# Patient Record
Sex: FEMALE | Race: WHITE | NOT HISPANIC OR LATINO | Employment: UNEMPLOYED | URBAN - METROPOLITAN AREA
[De-identification: names, ages, dates, MRNs, and addresses within clinical notes are randomized per-mention and may not be internally consistent; named-entity substitution may affect disease eponyms.]

---

## 2019-04-01 ENCOUNTER — HOSPITAL ENCOUNTER (EMERGENCY)
Facility: HOSPITAL | Age: 49
Discharge: HOME | End: 2019-04-01
Attending: EMERGENCY MEDICINE
Payer: MEDICAID

## 2019-04-01 VITALS
DIASTOLIC BLOOD PRESSURE: 71 MMHG | BODY MASS INDEX: 24.84 KG/M2 | SYSTOLIC BLOOD PRESSURE: 142 MMHG | HEART RATE: 81 BPM | TEMPERATURE: 98 F | OXYGEN SATURATION: 98 % | HEIGHT: 62 IN | WEIGHT: 135 LBS | RESPIRATION RATE: 18 BRPM
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
## 2019-04-01 DIAGNOSIS — S41.112A ARM LACERATION, LEFT, INITIAL ENCOUNTER: Primary | ICD-10-CM

## 2019-04-01 DIAGNOSIS — S51.852A DOG BITE OF LEFT FOREARM: ICD-10-CM

## 2019-04-01 LAB
B-HCG UR QL: NEGATIVE
CTP QC/QA: YES

## 2019-04-01 PROCEDURE — 99284 EMERGENCY DEPT VISIT MOD MDM: CPT | Mod: 25

## 2019-04-01 PROCEDURE — 90715 TDAP VACCINE 7 YRS/> IM: CPT | Performed by: PHYSICIAN ASSISTANT

## 2019-04-01 PROCEDURE — 90471 IMMUNIZATION ADMIN: CPT | Performed by: PHYSICIAN ASSISTANT

## 2019-04-01 PROCEDURE — 12001 RPR S/N/AX/GEN/TRNK 2.5CM/<: CPT

## 2019-04-01 PROCEDURE — 81025 URINE PREGNANCY TEST: CPT | Performed by: PHYSICIAN ASSISTANT

## 2019-04-01 PROCEDURE — 25000003 PHARM REV CODE 250: Performed by: PHYSICIAN ASSISTANT

## 2019-04-01 PROCEDURE — 63600175 PHARM REV CODE 636 W HCPCS: Performed by: PHYSICIAN ASSISTANT

## 2019-04-01 RX ORDER — ACETAMINOPHEN 500 MG
1000 TABLET ORAL
Status: COMPLETED | OUTPATIENT
Start: 2019-04-01 | End: 2019-04-01

## 2019-04-01 RX ORDER — AMOXICILLIN AND CLAVULANATE POTASSIUM 875; 125 MG/1; MG/1
1 TABLET, FILM COATED ORAL 2 TIMES DAILY
Qty: 19 TABLET | Refills: 0 | Status: SHIPPED | OUTPATIENT
Start: 2019-04-01 | End: 2019-04-11

## 2019-04-01 RX ORDER — LIDOCAINE HYDROCHLORIDE 10 MG/ML
10 INJECTION INFILTRATION; PERINEURAL
Status: COMPLETED | OUTPATIENT
Start: 2019-04-01 | End: 2019-04-01

## 2019-04-01 RX ORDER — BACITRACIN 500 [USP'U]/G
OINTMENT TOPICAL
Status: COMPLETED | OUTPATIENT
Start: 2019-04-01 | End: 2019-04-01

## 2019-04-01 RX ORDER — AMOXICILLIN AND CLAVULANATE POTASSIUM 875; 125 MG/1; MG/1
1 TABLET, FILM COATED ORAL
Status: COMPLETED | OUTPATIENT
Start: 2019-04-01 | End: 2019-04-01

## 2019-04-01 RX ADMIN — BACITRACIN: 500 OINTMENT TOPICAL at 10:04

## 2019-04-01 RX ADMIN — ACETAMINOPHEN 1000 MG: 500 TABLET, FILM COATED ORAL at 10:04

## 2019-04-01 RX ADMIN — CLOSTRIDIUM TETANI TOXOID ANTIGEN (FORMALDEHYDE INACTIVATED), CORYNEBACTERIUM DIPHTHERIAE TOXOID ANTIGEN (FORMALDEHYDE INACTIVATED), BORDETELLA PERTUSSIS TOXOID ANTIGEN (GLUTARALDEHYDE INACTIVATED), BORDETELLA PERTUSSIS FILAMENTOUS HEMAGGLUTININ ANTIGEN (FORMALDEHYDE INACTIVATED), BORDETELLA PERTUSSIS PERTACTIN ANTIGEN, AND BORDETELLA PERTUSSIS FIMBRIAE 2/3 ANTIGEN 0.5 ML: 5; 2; 2.5; 5; 3; 5 INJECTION, SUSPENSION INTRAMUSCULAR at 10:04

## 2019-04-01 RX ADMIN — LIDOCAINE HYDROCHLORIDE 10 ML: 10 INJECTION, SOLUTION INFILTRATION; PERINEURAL at 10:04

## 2019-04-01 RX ADMIN — AMOXICILLIN AND CLAVULANATE POTASSIUM 1 TABLET: 875; 125 TABLET, FILM COATED ORAL at 10:04

## 2019-04-02 NOTE — ED PROVIDER NOTES
Encounter Date: 4/1/2019       History     Chief Complaint   Patient presents with    Animal Bite     pt states she was bit by her dog today on left forearm      Patient is a 49-year-old female presenting to the ER for evaluation of animal bite.  Patient states that she got in between her to pit bulls when 1 bit her.  She states she was bit on the left hand and forearm.  She denies any paresthesia or focal weakness or extremity.  She is not up-to-date on her tetanus vaccination.  Patient states that her dogs are up-to-date on their vaccinations.  She denies use of blood thinners.    The history is provided by the patient.     Review of patient's allergies indicates:   Allergen Reactions    Bactrim [sulfamethoxazole-trimethoprim] Hives     Past Medical History:   Diagnosis Date    Hypothyroid      Past Surgical History:   Procedure Laterality Date    BREAST SURGERY      CARPAL TUNNEL RELEASE      LIPOSUCTION      TONSILLECTOMY       History reviewed. No pertinent family history.  Social History     Tobacco Use    Smoking status: Current Every Day Smoker     Types: Cigarettes    Smokeless tobacco: Never Used   Substance Use Topics    Alcohol use: Yes     Comment: occasional    Drug use: No     Review of Systems   Constitutional: Negative for chills.   Respiratory: Negative for shortness of breath.    Cardiovascular: Negative for chest pain.   Musculoskeletal: Positive for myalgias.   Skin: Positive for wound.   Allergic/Immunologic: Negative for immunocompromised state.   Neurological: Negative for weakness and numbness.   Hematological: Does not bruise/bleed easily.   Psychiatric/Behavioral: Negative for confusion.       Physical Exam     Initial Vitals [04/01/19 2129]   BP Pulse Resp Temp SpO2   (!) 165/93 89 18 98.5 °F (36.9 °C) 98 %      MAP       --         Physical Exam    Vitals reviewed.  Constitutional: She appears well-developed and well-nourished. She is not diaphoretic. No distress.   HENT:    Head: Normocephalic and atraumatic.   Mouth/Throat: Oropharynx is clear and moist.   Eyes: Conjunctivae and EOM are normal.   Neck: Neck supple.   Cardiovascular: Normal rate, regular rhythm and normal heart sounds.   Pulmonary/Chest: Breath sounds normal.   Neurological: She is alert and oriented to person, place, and time. She has normal strength. No sensory deficit.   Skin: Skin is warm. Laceration (2 cm gaping laceration left ventral forearm.  0.25 cm laceration to left dorsal hand) noted.              ED Course   Lac Repair  Date/Time: 4/1/2019 11:24 PM  Performed by: Claudia Shultz PA-C  Authorized by: Kelly Ordonez MD   Body area: upper extremity  Location details: left upper arm  Laceration length: 2 cm  Foreign bodies: no foreign bodies  Tendon involvement: none  Nerve involvement: none  Vascular damage: yes    Anesthesia:  Local Anesthetic: lidocaine 1% without epinephrine  Preparation: Patient was prepped and draped in the usual sterile fashion.  Irrigation solution: saline  Irrigation method: syringe  Amount of cleaning: extensive  Debridement: none  Degree of undermining: none  Skin closure: 4-0 Prolene  Number of sutures: 3  Technique: simple  Approximation: loose  Approximation difficulty: simple  Dressing: 4x4 sterile gauze and antibiotic ointment  Patient tolerance: Patient tolerated the procedure well with no immediate complications        Labs Reviewed   POCT URINE PREGNANCY          Imaging Results          X-Ray Hand 3 view Left (Final result)  Result time 04/01/19 22:47:59    Final result by Brice Levy MD (04/01/19 22:47:59)                 Impression:      No acute osseous abnormality identified.      Electronically signed by: Brice Levy MD  Date:    04/01/2019  Time:    22:47             Narrative:    EXAMINATION:  XR FOREARM LEFT; XR HAND COMPLETE 3 VIEW LEFT    CLINICAL HISTORY:  animal bite;Open bite of left forearm, initial encounter    TECHNIQUE:  AP and lateral views  of the left forearm were performed.  Left hand three views.    COMPARISON:  None    FINDINGS:  No evidence of acute displaced fracture, dislocation, or osseous destructive process.  No significant elbow joint effusion.  There is soft tissue injury with small amount of subcutaneous soft tissue air seen at the anterior aspect of the mid forearm.  No radiopaque retained foreign body seen.                               X-Ray Forearm Left (Final result)  Result time 04/01/19 22:47:59    Final result by Brice Levy MD (04/01/19 22:47:59)                 Impression:      No acute osseous abnormality identified.      Electronically signed by: Brice Levy MD  Date:    04/01/2019  Time:    22:47             Narrative:    EXAMINATION:  XR FOREARM LEFT; XR HAND COMPLETE 3 VIEW LEFT    CLINICAL HISTORY:  animal bite;Open bite of left forearm, initial encounter    TECHNIQUE:  AP and lateral views of the left forearm were performed.  Left hand three views.    COMPARISON:  None    FINDINGS:  No evidence of acute displaced fracture, dislocation, or osseous destructive process.  No significant elbow joint effusion.  There is soft tissue injury with small amount of subcutaneous soft tissue air seen at the anterior aspect of the mid forearm.  No radiopaque retained foreign body seen.                                       APC / Resident Notes:   Patient seen in the ER promptly upon arrival.  She is afebrile, no acute distress.  Physical examination does reveal gaping laceration to the left forearm which will require suture.  Patient given Augmentin and Tylenol in the ED.  She was updated on her tetanus vaccination.      Wound was aggressively cleansed.  1% lidocaine uses anesthetic agent.  Three the stitches were placed using 4-0 Prolene sutures.  The wound was loosely approximated.  Patient tolerated this well. Bleeding controlled.  Will prescribe patient home on Augmentin to use as directed.  She was advised to keep the  affected region dry and clean.  Advised to watch for signs of infection including redness, swelling, drainage, fever chills.  She was given strict return precautions to the ED.  Patient advised to take Tylenol for pain if needed.  Patient agreeable to this treatment plan.  She is stable for discharge and close follow-up.                     Clinical Impression:       ICD-10-CM ICD-9-CM   1. Arm laceration, left, initial encounter S41.112A 884.0   2. Dog bite of left forearm S51.852A 881.00    W54.0XXA E906.0         Disposition:   Disposition: Discharged  Condition: Stable                        Claudia Shultz PA-C  04/02/19 1524

## 2019-04-02 NOTE — ED TRIAGE NOTES
Pt arrived to the ED via POV from home with c/o bite. Pt states she got between her two dogs and was bitten by one. On admit to the bed, bleeding controlled with gauze and coban.

## 2019-07-26 DIAGNOSIS — G89.4 CHRONIC PAIN SYNDROME: ICD-10-CM

## 2019-07-26 DIAGNOSIS — M25.552 LEFT HIP PAIN: ICD-10-CM

## 2019-07-26 DIAGNOSIS — M77.11 RIGHT LATERAL EPICONDYLITIS: ICD-10-CM

## 2019-07-26 DIAGNOSIS — M51.36 DEGENERATION OF LUMBAR INTERVERTEBRAL DISC: Primary | ICD-10-CM

## 2019-08-15 ENCOUNTER — CLINICAL SUPPORT (OUTPATIENT)
Dept: REHABILITATION | Facility: HOSPITAL | Age: 49
End: 2019-08-15
Payer: MEDICAID

## 2019-08-15 DIAGNOSIS — M54.9 BACK PAIN, UNSPECIFIED BACK LOCATION, UNSPECIFIED BACK PAIN LATERALITY, UNSPECIFIED CHRONICITY: ICD-10-CM

## 2019-08-15 DIAGNOSIS — M62.81 MUSCLE WEAKNESS: ICD-10-CM

## 2019-08-15 DIAGNOSIS — M25.60 JOINT STIFFNESS: ICD-10-CM

## 2019-08-15 PROCEDURE — 97110 THERAPEUTIC EXERCISES: CPT | Mod: PN

## 2019-08-15 PROCEDURE — 97161 PT EVAL LOW COMPLEX 20 MIN: CPT | Mod: PN

## 2019-08-15 NOTE — PLAN OF CARE
[facility]  Physical Therapy Initial Evaluation    Name: Jessica HADLEY  Clinic Number: [id number]    Therapy Diagnosis:   Encounter Diagnoses   Name Primary?    Joint stiffness     Muscle weakness     Back pain, unspecified back location, unspecified back pain laterality, unspecified chronicity      Physician: Naheed Mccabe, KALPESH    Physician Orders: PT Eval and Treat   Medical Diagnosis from Referral:   M51.36 (ICD-10-CM) - Degeneration of lumbar intervertebral disc   G89.4 (ICD-10-CM) - Chronic pain syndrome   M25.552 (ICD-10-CM) - Left hip pain   M77.11 (ICD-10-CM) - Right lateral epicondylitis       Evaluation Date: 8/15/2019  Plan of Care Expiration: 11/15/19    Authorization Period Expiration: 8/20/19  Visit # / Visits authorized: 1/ 1      Time In: 1300  Time Out: 1400    Total Billable Time: 60 minutes    Precautions: Standard    Subjective   Date of onset: several years    History of current condition:   JESSICA  is a 49 year old right handed female  presenting with bilateral hand weakness, left hip pain, low back pain and left elbow pain.  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  She state her pain is due to wear and tear.  She reports injections in the elbows 8 months ago with moderate relief.  She denies any recent diagnostics of the lumbar spine.  Her goal is to get rid of some pain.  She would also like to work or sleep through the night without pain.      Medical History:   Past Medical History:   Diagnosis Date    Hypothyroid        Surgical History:   Jessica HADLEY  has a past surgical history that includes Breast surgery; Liposuction; Carpal tunnel release; and Tonsillectomy.    Medications:   Jessica has a current medication list which includes the following prescription(s): levothyroxine.    Allergies:   Review of patient's allergies indicates:   Allergen Reactions    Bactrim  [sulfamethoxazole-trimethoprim] Hives        Imaging: none on record for low back    Prior Therapy: none  Social History:  Lives with grandson, 2 story  Occupation: [employer], housecleaning, touring/ for [email or web address]  Prior Level of Function: independent  Current Level of Function: independent    Pain:  Current 7/10, worst 10/10, best 5/10   Location:  lumbar      Aggravating Factors: prolonged sitting, standing, sleeping, working, driving, housework  ('pretty constant)  Easing Factors: hot baths    Pts goals:  Her goal is to get rid of some pain.  She would also like to work or sleep through the night without pain.      Objective     Observation: pt stands with right hip higher than contralateral left. Left lateral shift. Flat back posture.   Gait: No sign of antalgia or abnormalities with gait.   Palpation: Mild tenderness on palpation of left lateral epicondyle, T12-S1 interspinous space      Range of Motion/Strength:      Left Elbow: AROM: WNL   4/5 gross strength.     Lumbar AROM: Degrees   Flexion 55   Extension 15   Right side bending 10   Left side bending 10   Lumbar quadrant reveals: increase in discomfort in all planes    AROM: Bilateral LE: Grossly WFL  MMT:  Right LE: 4-   Left LE: 4-  Abdominal Strength: 4-    Mobility: L/S p/a grade 2-  Flexibility: hip flexor length: fair      Hamstring Length:fair    Bed Mobility:Independent  Transfers: Independent    Special Tests:   -LLD: Left>right 1 cm (corrected with MET)  -Slump: Negative  -SLR: negative  -Hip scour: negative  -Zaid's: Negative  -SIJ gapping and compression: Negative    CMS Impairment/Limitation/Restriction for FOTO Lumbar Spine Survey  Status Limitation G-Code CMS Severity Modifier  Intake 38% 62% Current Status CL - At least 60 percent but less than 80 percent  Predicted 49% 51% Goal Status+ CK - At least 40 percent but less than 60 percent    TREATMENT     Treatment Time In: 1330  Treatment Time Out: 1400    Total Treatment time  separate from Evaluation: 30 minutes    JESSICA received therapeutic exercises to develop strength, endurance, ROM, flexibility, posture and core stabilization for 10 minutes including:   -LTR x 20  - (shift correction) left to right x 20  - elbow extensors stretch 20 sec x 4  -TrA recruitment 2 x 10    JESSICA received the following manual therapy techniques:  were applied to the: lumbar spine for 5 minutes, including:  MET left innominate posterior      Education provided:   - HEP compliance    Written Home Exercises Provided: yes.    Exercises were reviewed and JESSICA was able to demonstrate them prior to the end of the session.  JESSICA demonstrated good  understanding of the education provided.     See EMR under Patient Instructions for exercises provided 8/15/2019.    Assessment     Pt presents with signs and symptoms consistent with referring diagnosis. Evaluation has determined a decrease in functional status and subjective and objective deficits that can be addressed by physical therapy intervention. Pt demonstrates pain limiting functional activities. Decreased flexibility and strength limiting normal movement patterns. Decreased segmental motion. Decreased postural strength and awareness. Positive special testing. Decreased participation in functional and recreational activities. Subjective and objective measures are addressed by goals in the plan of care.  Patient/family are involved in the development of these goals. Patient/family are educated about current injury and treatment.       Plan of care was dicussed with patient. Pt will benefit from skilled outpatient Physical Therapy to address the deficits stated above and in the chart below, provide pt/family education, and to maximize pt's level of independence. Pt's spiritual, cultural and educational needs considered and patient is agreeable to the plan of care and goals as stated below:     Pt prognosis is Good.  Anticipated Barriers for therapy:  none    Medical Necessity is demonstrated by the following  History  Co-morbidities and personal factors that may impact the plan of care Co-morbidities:    CTS    Personal Factors:   no deficits     low   Examination  Body Structures and Functions, activity limitations and participation restrictions that may impact the plan of care Body Regions:   back  lower extremities  upper extremities    Body Systems:    gross symmetry  ROM  strength    Participation Restrictions:   Work duties    Activity limitations:   Learning and applying knowledge  no deficits    General Tasks and Commands  no deficits    Communication  no deficits    Mobility  lifting and carrying objects  walking  moving around using equipment (WC)    Self care  no deficits    Domestic Life  shopping  cooking  doing house work (cleaning house, washing dishes, laundry)    Interactions/Relationships  no deficits    Life Areas  no deficits    Community and Social Life  community life  recreation and leisure         low   Clinical Presentation stable and uncomplicated low   Decision Making/ Complexity Score: low     Goals:    Short Term Goals (4 Weeks):     1.Pt to increase strength by a 1/2 grade of muscles test to allow for improvement in functional activities such as performing chores.  2.Pt to improve range of motion by 25% to allow for improved functional mobility to allow for improvement in IADLs.   3.Pt to report compliance with HEP and demonstrate proper exercise technique to PT to show competence with self management of condition.  4.Decrease pain by 25% during functional activities.    Long Term Goals (12 Weeks):     1. Increase ROM to allow improved joint biomechanics during functional activities.   2.Increase trunk and lower extremity strength to within normal limits during functional activities.   3. Independent with home exercise program.   4. Full return to functional activities with manageable complaints.  5. Patient to demonstrate improved  posture and body mechanics.  6. Decrease pain by 75% during functional activities.    Plan     Plan of care Certification: 8/15/2019 to 11/15/19.    Recommended Treatment Plan: 2 times per week for 12 weeks with treatments to consist of:  Neuromuscular and postural re-education,  training, therapeutic exercise, therapeutic activities,balance training, gait training, manual therapy, soft tissue mobilization, ROM exercises, Cardiovascular,  Postural stabilization, manual traction, spinal mobilization, moist heat, cryotherapy, electrical stimulation, ultrasound, home exercise education and planning.    Sebastián Zepeda, PT

## 2019-09-04 ENCOUNTER — CLINICAL SUPPORT (OUTPATIENT)
Dept: REHABILITATION | Facility: HOSPITAL | Age: 49
End: 2019-09-04
Payer: MEDICAID

## 2019-09-04 DIAGNOSIS — M25.60 JOINT STIFFNESS: ICD-10-CM

## 2019-09-04 DIAGNOSIS — M62.81 MUSCLE WEAKNESS: ICD-10-CM

## 2019-09-04 DIAGNOSIS — M54.9 BACK PAIN, UNSPECIFIED BACK LOCATION, UNSPECIFIED BACK PAIN LATERALITY, UNSPECIFIED CHRONICITY: ICD-10-CM

## 2019-09-04 PROCEDURE — 97110 THERAPEUTIC EXERCISES: CPT | Mod: PN

## 2019-09-04 NOTE — PROGRESS NOTES
Physical Therapy Daily Treatment Note     Name: Jessica HADLEY  Clinic Number: [id number]    Therapy Diagnosis:   Encounter Diagnoses   Name Primary?    Joint stiffness     Muscle weakness     Back pain, unspecified back location, unspecified back pain laterality, unspecified chronicity      Physician: Naheed Mccabe FNP-C    Visit Date: 9/4/2019  Physician Orders: PT Eval and Treat   Medical Diagnosis from Referral:   M51.36 (ICD-10-CM) - Degeneration of lumbar intervertebral disc   G89.4 (ICD-10-CM) - Chronic pain syndrome   M25.552 (ICD-10-CM) - Left hip pain   M77.11 (ICD-10-CM) - Right lateral epicondylitis         Evaluation Date: 8/15/2019  Plan of Care Expiration: 11/15/19     Authorization Period Expiration: 11/3/19  Visit # / Visits authorized: 1/10    Time In: 0820  Time Out: 0925    Total Billable Time: 65 minutes    Precautions: standard     Subjective     Pt reports: no significant changes since initial visit.  She was fairly compliant with home exercise program.  Response to previous treatment: fair  Functional change: none    Pain: 8/10  Location:  Lumbar     Objective     JESSICA received therapeutic exercises to develop strength, endurance, ROM, flexibility, posture and core stabilization for 45 minutes including:     -LTR x 20  - (shift correction) left to right x 20  -prone press-up x 20  -prone on elbows 1min x 2  - elbow extensors stretch 20 sec x 4  -prone opposite arm/leg, multifidi x 10  -pelvic tilts x 20  -TrA recruitment 2 x 10  -ball squeeze 2 x 10  -supine resisted hip abduction 3 x 10 RTB  -piriformis stretch 20 sec x 4     JESSICA received the following manual therapy techniques:  were applied to the: lumbar spine for 10 minutes, including:  MET left innominate posterior; np  -lumbar distraction in hooklying with belt    CP x 10 min post treatment L/S.         Education provided:   - HEP compliance      Written Home Exercises Provided: Patient instructed to cont prior  HEP.  Exercises were reviewed and JESSICA was able to demonstrate them prior to the end of the session.  JESSICA demonstrated good  understanding of the education provided.     See EMR under Patient Instructions for exercises provided 9/4/2019.    Assessment     No sign of pelvic obliquity today.  Improved performance with pelvic mobility. Requires min cueing with abdominal response.  Fair response to initiation of manual traction. JESSICA is progressing well towards her goals.   Pt prognosis is Good.     Pt will continue to benefit from skilled outpatient physical therapy to address the deficits listed in the problem list box on initial evaluation, provide pt/family education and to maximize pt's level of independence in the home and community environment.     Pt's spiritual, cultural and educational needs considered and pt agreeable to plan of care and goals.     Anticipated barriers to physical therapy:     Short Term Goals (4 Weeks):     1.Pt to increase strength by a 1/2 grade of muscles test to allow for improvement in functional activities such as performing chores.  2.Pt to improve range of motion by 25% to allow for improved functional mobility to allow for improvement in IADLs.   3.Pt to report compliance with HEP and demonstrate proper exercise technique to PT to show competence with self management of condition.  4.Decrease pain by 25% during functional activities.    Long Term Goals (12 Weeks):     1. Increase ROM to allow improved joint biomechanics during functional activities.   2.Increase trunk and lower extremity strength to within normal limits during functional activities.   3. Independent with home exercise program.   4. Full return to functional activities with manageable complaints.  5. Patient to demonstrate improved posture and body mechanics.  6. Decrease pain by 75% during functional activities.       Plan       Recommended Treatment Plan: 2-3 times per week for 12 weeks with treatments to  consist of:  Neuromuscular and postural re-education,  training, therapeutic exercise, therapeutic activities,balance training, gait training, manual therapy, soft tissue mobilization, ROM exercises, Cardiovascular,  Postural stabilization, manual traction, spinal mobilization, moist heat, cryotherapy, electrical stimulation, ultrasound, home exercise education and planning.    Continue with established Plan of Care towards PT goals.     Sebastián Zepeda, PT

## 2019-09-11 ENCOUNTER — CLINICAL SUPPORT (OUTPATIENT)
Dept: REHABILITATION | Facility: HOSPITAL | Age: 49
End: 2019-09-11
Payer: MEDICAID

## 2019-09-11 DIAGNOSIS — M62.81 MUSCLE WEAKNESS: ICD-10-CM

## 2019-09-11 DIAGNOSIS — M25.60 JOINT STIFFNESS: ICD-10-CM

## 2019-09-11 DIAGNOSIS — M54.9 BACK PAIN, UNSPECIFIED BACK LOCATION, UNSPECIFIED BACK PAIN LATERALITY, UNSPECIFIED CHRONICITY: ICD-10-CM

## 2019-09-11 PROCEDURE — 97110 THERAPEUTIC EXERCISES: CPT | Mod: PN

## 2019-09-11 NOTE — PROGRESS NOTES
Physical Therapy Daily Treatment Note     Name: Jessica HADLEY  Clinic Number: [id number]    Therapy Diagnosis:   Encounter Diagnoses   Name Primary?    Joint stiffness     Muscle weakness     Back pain, unspecified back location, unspecified back pain laterality, unspecified chronicity      Physician: Naheed Mccabe FNP-C    Visit Date: 9/11/2019  Physician Orders: PT Eval and Treat   Medical Diagnosis from Referral:   M51.36 (ICD-10-CM) - Degeneration of lumbar intervertebral disc   G89.4 (ICD-10-CM) - Chronic pain syndrome   M25.552 (ICD-10-CM) - Left hip pain   M77.11 (ICD-10-CM) - Right lateral epicondylitis         Evaluation Date: 8/15/2019  Plan of Care Expiration: 11/15/19     Authorization Period Expiration: 11/3/19  Visit # / Visits authorized: 2/10    Time In: 0830  Time Out: 0915    Total Billable Time: 40 minutes    Precautions: standard     Subjective     Pt reports: thel ow back is doing a little better.  Less sore in the mornings. Pt states she needs to leave early today.  She was fairly compliant with home exercise program.  Response to previous treatment: fair  Functional change: none    Pain: 4/10  Location:  Lumbar     Objective     JESSICA received therapeutic exercises to develop strength, endurance, ROM, flexibility, posture and core stabilization for 30 minutes including:     -LTR x 20  - (shift correction) left to right x 20  -prone press-up x 20  -prone on elbows 1min x 2  - elbow extensors stretch 20 sec x 4  -prone opposite arm/leg, multifidi x 10; np  -pelvic tilts x 20  -TrA recruitment 2 x 10  -ball squeeze 2 x 10;np  -supine resisted hip abduction 3 x 10 RTB; np  -piriformis stretch 20 sec x 4  -standing lumbar extension x 20     JESSICA received the following manual therapy techniques:  were applied to the: lumbar spine for 10 minutes, including:  MET left innominate posterior; np  -lumbar distraction in hooklying with belt    CP x 10 min post treatment L/S.  NP        Education  provided:   - HEP compliance      Written Home Exercises Provided: Patient instructed to cont prior HEP.  Exercises were reviewed and JESSICA was able to demonstrate them prior to the end of the session.  JESSICA demonstrated good  understanding of the education provided.     See EMR under Patient Instructions for exercises provided 9/11/2019.    Assessment     No sign of pelvic obliquity today.  Requires decreased cueing with abdominal response and pelvic mobility.  Fair response to continuation of manual traction. JESSICA is progressing well towards her goals.     Pt prognosis is Good.     Pt will continue to benefit from skilled outpatient physical therapy to address the deficits listed in the problem list box on initial evaluation, provide pt/family education and to maximize pt's level of independence in the home and community environment.     Pt's spiritual, cultural and educational needs considered and pt agreeable to plan of care and goals.     Anticipated barriers to physical therapy:     Short Term Goals (4 Weeks):     1.Pt to increase strength by a 1/2 grade of muscles test to allow for improvement in functional activities such as performing chores.  2.Pt to improve range of motion by 25% to allow for improved functional mobility to allow for improvement in IADLs.   3.Pt to report compliance with HEP and demonstrate proper exercise technique to PT to show competence with self management of condition.  4.Decrease pain by 25% during functional activities.    Long Term Goals (12 Weeks):     1. Increase ROM to allow improved joint biomechanics during functional activities.   2.Increase trunk and lower extremity strength to within normal limits during functional activities.   3. Independent with home exercise program.   4. Full return to functional activities with manageable complaints.  5. Patient to demonstrate improved posture and body mechanics.  6. Decrease pain by 75% during functional activities.       Plan        Recommended Treatment Plan: 2-3 times per week for 12 weeks with treatments to consist of:  Neuromuscular and postural re-education,  training, therapeutic exercise, therapeutic activities,balance training, gait training, manual therapy, soft tissue mobilization, ROM exercises, Cardiovascular,  Postural stabilization, manual traction, spinal mobilization, moist heat, cryotherapy, electrical stimulation, ultrasound, home exercise education and planning.    Continue with established Plan of Care towards PT goals.     Sebastián Zepeda, PT

## 2019-09-26 ENCOUNTER — CLINICAL SUPPORT (OUTPATIENT)
Dept: REHABILITATION | Facility: HOSPITAL | Age: 49
End: 2019-09-26
Payer: MEDICAID

## 2019-09-26 DIAGNOSIS — M62.81 MUSCLE WEAKNESS: ICD-10-CM

## 2019-09-26 DIAGNOSIS — M54.9 BACK PAIN, UNSPECIFIED BACK LOCATION, UNSPECIFIED BACK PAIN LATERALITY, UNSPECIFIED CHRONICITY: ICD-10-CM

## 2019-09-26 DIAGNOSIS — M25.60 JOINT STIFFNESS: ICD-10-CM

## 2019-09-26 PROCEDURE — 97110 THERAPEUTIC EXERCISES: CPT | Mod: PN

## 2019-09-26 NOTE — PROGRESS NOTES
Physical Therapy Progress Note     Name: Jessica HADLEY  Clinic Number: [id number]    Therapy Diagnosis:   Encounter Diagnoses   Name Primary?    Joint stiffness     Muscle weakness     Back pain, unspecified back location, unspecified back pain laterality, unspecified chronicity      Physician: Naheed Mccabe FNP-C    Visit Date: 9/26/2019  Physician Orders: PT Eval and Treat   Medical Diagnosis from Referral:   M51.36 (ICD-10-CM) - Degeneration of lumbar intervertebral disc   G89.4 (ICD-10-CM) - Chronic pain syndrome   M25.552 (ICD-10-CM) - Left hip pain   M77.11 (ICD-10-CM) - Right lateral epicondylitis         Evaluation Date: 8/15/2019  Plan of Care Expiration: 11/15/19     Authorization Period Expiration: 11/3/19  Visit # / Visits authorized: 3/10    Time In: 0700  Time Out: 0800    Total Billable Time: 45 minutes    Precautions: standard     Subjective     Pt reports:moderate pain yesterday doing better this morning and doing better overall.   She was fairly compliant with home exercise program.  Response to previous treatment: fair  Functional change: none    Pain: 5/10  Location:  Lumbar     Objective   Range of Motion/Strength:       Left Elbow: AROM: WNL   4/5 gross strength.      Lumbar AROM: Degrees   Flexion 55   Extension 10   Right side bending 12   Left side bending 12   Lumbar quadrant reveals: increase in discomfort in all planes     AROM: Bilateral LE: Grossly WFL  MMT:  Right LE: 4   Left LE: 4  Abdominal Strength: 4-     Mobility: L/S p/a grade 2-        JESSICA received therapeutic exercises to develop strength, endurance, ROM, flexibility, posture and core stabilization for 40 minutes including:     -LTR x 20  - (shift correction) left to right x 20  -prone press-up x 20: hold due to poor response to extension therex today.   -prone on elbows 1min x 2  - elbow extensors stretch 20 sec x 4: np  -prone opposite arm/leg, multifidi x 10; hold  -pelvic tilts x 20  -TrA recruitment 2 x  10  -ball squeeze 2 x 10;np  -supine resisted hip abduction 3 x 10 RTB; np  -piriformis stretch 20 sec x 4  -standing lumbar extension x 20     JESSICA received the following manual therapy techniques:  were applied to the: lumbar spine for 10 minutes, including:  MET left innominate posterior; np  -lumbar distraction in hooklying with belt    CP + IFC x 12 min post treatment L/S.          Education provided:   - HEP compliance      Written Home Exercises Provided: Patient instructed to cont prior HEP.  Exercises were reviewed and JESSICA was able to demonstrate them prior to the end of the session.  JESSICA demonstrated good  understanding of the education provided.     See EMR under Patient Instructions for exercises provided 9/26/2019.    Assessment     Modified therex due to pt presentation. Requires decreased cueing with abdominal response and pelvic mobility.  Fair response to exercise progression consisting of stabilization therex.  Initiated IFC to decrease pain and muscle guarding.  JESSICA is progressing well towards her goals.     Pt prognosis is Good.     Pt will continue to benefit from skilled outpatient physical therapy to address the deficits listed in the problem list box on initial evaluation, provide pt/family education and to maximize pt's level of independence in the home and community environment.     Pt's spiritual, cultural and educational needs considered and pt agreeable to plan of care and goals.     Anticipated barriers to physical therapy:     Short Term Goals (4 Weeks):   Updated 9/26/19  Partially MET    1.Pt to increase strength by a 1/2 grade of muscles test to allow for improvement in functional activities such as performing chores. MET  2.Pt to improve range of motion by 25% to allow for improved functional mobility to allow for improvement in IADLs. Not MET  3.Pt to report compliance with HEP and demonstrate proper exercise technique to PT to show competence with self management of  condition. MET  4.Decrease pain by 25% during functional activities. Inconsistently MeT    Long Term Goals (12 Weeks):     1. Increase ROM to allow improved joint biomechanics during functional activities.   2.Increase trunk and lower extremity strength to within normal limits during functional activities.   3. Independent with home exercise program.   4. Full return to functional activities with manageable complaints.  5. Patient to demonstrate improved posture and body mechanics.  6. Decrease pain by 75% during functional activities.       Plan       Recommended Treatment Plan: 2-3 times per week for 12 weeks with treatments to consist of:  Neuromuscular and postural re-education,  training, therapeutic exercise, therapeutic activities,balance training, gait training, manual therapy, soft tissue mobilization, ROM exercises, Cardiovascular,  Postural stabilization, manual traction, spinal mobilization, moist heat, cryotherapy, electrical stimulation, ultrasound, home exercise education and planning.    Continue with established Plan of Care towards PT goals.     Sebastián Zepeda, PT

## 2019-10-01 ENCOUNTER — CLINICAL SUPPORT (OUTPATIENT)
Dept: REHABILITATION | Facility: HOSPITAL | Age: 49
End: 2019-10-01
Payer: MEDICAID

## 2019-10-01 DIAGNOSIS — M62.81 MUSCLE WEAKNESS: ICD-10-CM

## 2019-10-01 DIAGNOSIS — M25.60 JOINT STIFFNESS: ICD-10-CM

## 2019-10-01 DIAGNOSIS — M54.9 BACK PAIN, UNSPECIFIED BACK LOCATION, UNSPECIFIED BACK PAIN LATERALITY, UNSPECIFIED CHRONICITY: ICD-10-CM

## 2019-10-01 PROCEDURE — 97110 THERAPEUTIC EXERCISES: CPT | Mod: PN

## 2019-10-01 NOTE — PROGRESS NOTES
Physical Therapy Daily treatment  Note     Name: Jessica HADLEY  Clinic Number: [id number]    Therapy Diagnosis:   Encounter Diagnoses   Name Primary?    Joint stiffness     Muscle weakness     Back pain, unspecified back location, unspecified back pain laterality, unspecified chronicity      Physician: Naheed Mccabe FNP-C    Visit Date: 10/1/2019  Physician Orders: PT Eval and Treat   Medical Diagnosis from Referral:   M51.36 (ICD-10-CM) - Degeneration of lumbar intervertebral disc   G89.4 (ICD-10-CM) - Chronic pain syndrome   M25.552 (ICD-10-CM) - Left hip pain   M77.11 (ICD-10-CM) - Right lateral epicondylitis         Evaluation Date: 8/15/2019  Plan of Care Expiration: 11/15/19     Authorization Period Expiration: 11/3/19  Visit # / Visits authorized: 3/10    Time In: 0700  Time Out: 0800    Total Billable Time: 45 minutes    Precautions: standard     Subjective     Pt reports continued moderate low back pain but better today.  States she has taken the week off of work to let her back rest.   She was fairly compliant with home exercise program.  Response to previous treatment: fair  Functional change: none    Pain: 5/10  Location:  Lumbar     Objective       JESSICA received therapeutic exercises to develop strength, endurance, ROM, flexibility, posture and core stabilization for 40 minutes including:     -LTR x 20  - (shift correction) left to right x 20  -prone press-up x 20: hold due to poor response to extension therex today.   -prone on elbows 1min x 2  - elbow extensors stretch 20 sec x 4: np  -pelvic tilts x 20  -TrA recruitment 2 x 10 with hip flex/ext   -ball squeeze 2 x 10;np  -supine resisted hip abduction 3 x 10 RTB; np  -piriformis stretch 20 sec x 4  -standing lumbar extension x 20  -prone alt arm/alt leg x 20 modified  -resisted trunk rotation in hooklying GTB 2 x 15     JESSICA received the following manual therapy techniques:  were applied to the: lumbar spine for 10 minutes, including:  MET  left innominate posterior; np  -lumbar distraction in hooklying with belt    CP + IFC x 12 min post treatment L/S.          Education provided:   - HEP compliance      Written Home Exercises Provided: Patient instructed to cont prior HEP.  Exercises were reviewed and JESSICA was able to demonstrate them prior to the end of the session.  JESSICA demonstrated good  understanding of the education provided.     See EMR under Patient Instructions for exercises provided 10/1/2019.    Assessment     Improved performance abdominal response and pelvic mobility.  Good response to exercise progression consisting of stabilization therex.  Continued  IFC to decrease pain and muscle guarding.  JESSICA is progressing well towards her goals.     Pt prognosis is Good.     Pt will continue to benefit from skilled outpatient physical therapy to address the deficits listed in the problem list box on initial evaluation, provide pt/family education and to maximize pt's level of independence in the home and community environment.     Pt's spiritual, cultural and educational needs considered and pt agreeable to plan of care and goals.     Anticipated barriers to physical therapy:     Short Term Goals (4 Weeks):   Updated 9/26/19  Partially MET    1.Pt to increase strength by a 1/2 grade of muscles test to allow for improvement in functional activities such as performing chores. MET  2.Pt to improve range of motion by 25% to allow for improved functional mobility to allow for improvement in IADLs. Not MET  3.Pt to report compliance with HEP and demonstrate proper exercise technique to PT to show competence with self management of condition. MET  4.Decrease pain by 25% during functional activities. Inconsistently MeT    Long Term Goals (12 Weeks):     1. Increase ROM to allow improved joint biomechanics during functional activities.   2.Increase trunk and lower extremity strength to within normal limits during functional activities.   3.  Independent with home exercise program.   4. Full return to functional activities with manageable complaints.  5. Patient to demonstrate improved posture and body mechanics.  6. Decrease pain by 75% during functional activities.       Plan       Recommended Treatment Plan: 2-3 times per week for 12 weeks with treatments to consist of:  Neuromuscular and postural re-education,  training, therapeutic exercise, therapeutic activities,balance training, gait training, manual therapy, soft tissue mobilization, ROM exercises, Cardiovascular,  Postural stabilization, manual traction, spinal mobilization, moist heat, cryotherapy, electrical stimulation, ultrasound, home exercise education and planning.    Continue with established Plan of Care towards PT goals.     Sebastián Zepeda, PT

## 2019-10-08 ENCOUNTER — CLINICAL SUPPORT (OUTPATIENT)
Dept: REHABILITATION | Facility: HOSPITAL | Age: 49
End: 2019-10-08
Payer: MEDICAID

## 2019-10-08 DIAGNOSIS — M54.9 BACK PAIN, UNSPECIFIED BACK LOCATION, UNSPECIFIED BACK PAIN LATERALITY, UNSPECIFIED CHRONICITY: ICD-10-CM

## 2019-10-08 DIAGNOSIS — M25.60 JOINT STIFFNESS: ICD-10-CM

## 2019-10-08 DIAGNOSIS — M62.81 MUSCLE WEAKNESS: ICD-10-CM

## 2019-10-08 PROCEDURE — 97110 THERAPEUTIC EXERCISES: CPT | Mod: PN

## 2019-10-08 NOTE — PROGRESS NOTES
Physical Therapy Daily treatment  Note     Name: Jessica HADLEY  Clinic Number: [id number]    Therapy Diagnosis:   Encounter Diagnoses   Name Primary?    Joint stiffness     Muscle weakness     Back pain, unspecified back location, unspecified back pain laterality, unspecified chronicity      Physician: Naheed Mccabe FNP-C    Visit Date: 10/8/2019  Physician Orders: PT Eval and Treat   Medical Diagnosis from Referral:   M51.36 (ICD-10-CM) - Degeneration of lumbar intervertebral disc   G89.4 (ICD-10-CM) - Chronic pain syndrome   M25.552 (ICD-10-CM) - Left hip pain   M77.11 (ICD-10-CM) - Right lateral epicondylitis         Evaluation Date: 8/15/2019  Plan of Care Expiration: 11/15/19     Authorization Period Expiration: 11/3/19  Visit # / Visits authorized: 5/10    Time In: 0700  Time Out: 0800    Total Billable Time: 45 minutes    Precautions: standard     Subjective     Pt states the low back has been feeling a little better due to taking time off of work.   She was fairly compliant with home exercise program.  Response to previous treatment: fair  Functional change: none    Pain: 5/10  Location:  Lumbar     Objective       JESSICA received therapeutic exercises to develop strength, endurance, ROM, flexibility, posture and core stabilization for 40 minutes including:     -LTR x 20  - (shift correction) left to right x 20  -TrA recruitment 2 x 10 with hip flex/ext   -supine resisted hip abduction 3 x 10 RTB; np  -piriformis stretch 20 sec x 4  -prone alt arm/alt leg x 20 modified  -resisted trunk rotation in hooklying GTB 2 x 15  -resisted anti rotation standing with GTB 2 x 15  -bird dog 2 x 10  -standing bilateral shoulder ext RTB 2 x 15     JESSICA received the following manual therapy techniques:  were applied to the: lumbar spine for 10 minutes, including:  -lumbar distraction in hooklying with belt    CP + IFC x 12 min post treatment L/S.  NP        Education provided:   - HEP compliance      Written Home  Exercises Provided: Patient instructed to cont prior HEP.  Exercises were reviewed and JESSICA was able to demonstrate them prior to the end of the session.  JESSICA demonstrated good  understanding of the education provided.     See EMR under Patient Instructions for exercises provided 10/8/2019.    Assessment     Improved performance abdominal response and pelvic mobility.  Good performance with progression of dynamic stabilization emphasis.  No c/o increased discomfort with prescribed activities.   JESSICA is progressing well towards her goals.     Pt prognosis is Good.     Pt will continue to benefit from skilled outpatient physical therapy to address the deficits listed in the problem list box on initial evaluation, provide pt/family education and to maximize pt's level of independence in the home and community environment.     Pt's spiritual, cultural and educational needs considered and pt agreeable to plan of care and goals.     Anticipated barriers to physical therapy:     Short Term Goals (4 Weeks):   Updated 9/26/19  Partially MET    1.Pt to increase strength by a 1/2 grade of muscles test to allow for improvement in functional activities such as performing chores. MET  2.Pt to improve range of motion by 25% to allow for improved functional mobility to allow for improvement in IADLs. Not MET  3.Pt to report compliance with HEP and demonstrate proper exercise technique to PT to show competence with self management of condition. MET  4.Decrease pain by 25% during functional activities. Inconsistently MeT    Long Term Goals (12 Weeks):     1. Increase ROM to allow improved joint biomechanics during functional activities.   2.Increase trunk and lower extremity strength to within normal limits during functional activities.   3. Independent with home exercise program.   4. Full return to functional activities with manageable complaints.  5. Patient to demonstrate improved posture and body mechanics.  6. Decrease  pain by 75% during functional activities.       Plan       Recommended Treatment Plan: 2-3 times per week for 12 weeks with treatments to consist of:  Neuromuscular and postural re-education,  training, therapeutic exercise, therapeutic activities,balance training, gait training, manual therapy, soft tissue mobilization, ROM exercises, Cardiovascular,  Postural stabilization, manual traction, spinal mobilization, moist heat, cryotherapy, electrical stimulation, ultrasound, home exercise education and planning.    Continue with established Plan of Care towards PT goals.     Sebastián Zepeda, PT

## 2019-10-22 ENCOUNTER — CLINICAL SUPPORT (OUTPATIENT)
Dept: REHABILITATION | Facility: HOSPITAL | Age: 49
End: 2019-10-22
Attending: NURSE PRACTITIONER
Payer: MEDICAID

## 2019-10-22 DIAGNOSIS — M62.81 MUSCLE WEAKNESS: ICD-10-CM

## 2019-10-22 DIAGNOSIS — M54.9 BACK PAIN, UNSPECIFIED BACK LOCATION, UNSPECIFIED BACK PAIN LATERALITY, UNSPECIFIED CHRONICITY: ICD-10-CM

## 2019-10-22 DIAGNOSIS — M25.60 JOINT STIFFNESS: ICD-10-CM

## 2019-10-22 PROCEDURE — 97110 THERAPEUTIC EXERCISES: CPT | Mod: PN

## 2019-10-22 NOTE — PROGRESS NOTES
Physical Therapy Daily treatment  Note     Name: Jessica HADLEY  Clinic Number: [id number]    Therapy Diagnosis:   Encounter Diagnoses   Name Primary?    Joint stiffness     Muscle weakness     Back pain, unspecified back location, unspecified back pain laterality, unspecified chronicity      Physician: Naheed Mccabe FNP-C    Visit Date: 10/22/2019  Physician Orders: PT Eval and Treat   Medical Diagnosis from Referral:   M51.36 (ICD-10-CM) - Degeneration of lumbar intervertebral disc   G89.4 (ICD-10-CM) - Chronic pain syndrome   M25.552 (ICD-10-CM) - Left hip pain   M77.11 (ICD-10-CM) - Right lateral epicondylitis         Evaluation Date: 8/15/2019  Plan of Care Expiration: 11/15/19     Authorization Period Expiration: 11/3/19  Visit # / Visits authorized: 5/10    Time In: 0725  Time Out: 0820    Total Billable Time: 30 minutes    Precautions: standard     Subjective     Pt states she did well when taking time off of work.  States her low back is doing a little better.   She was fairly compliant with home exercise program.  Response to previous treatment: fair  Functional change: none    Pain: 4/10  Location:  Lumbar     Objective       JESSICA received therapeutic exercises to develop strength, endurance, ROM, flexibility, posture and core stabilization for 40 minutes including:     -LTR x 20  - (shift correction) left to right x 20  -TrA recruitment 2 x 10 with hip flex/ext   -supine resisted hip abduction 3 x 10 RTB; np  -piriformis stretch 20 sec x 4  -prone alt arm/alt leg x 20 modified; np  -resisted trunk rotation in hooklying GTB 2 x 15  -resisted anti rotation standing with GTB 2 x 15  -bird dog 2 x 10  -standing bilateral shoulder ext RTB 2 x 15  -resisted open book with RTB 2 x 15     JESSICA received the following manual therapy techniques:  were applied to the: lumbar spine for 10 minutes, including:  -lumbar distraction in hooklying with belt    CP + IFC x 12 min post treatment L/S.  NP         Education provided:   - HEP compliance      Written Home Exercises Provided: Patient instructed to cont prior HEP.  Exercises were reviewed and JESSICA was able to demonstrate them prior to the end of the session.  JESSICA demonstrated good  understanding of the education provided.     See EMR under Patient Instructions for exercises provided 10/22/2019.    Assessment     Requires decreased cueing abdominal response and pelvic mobility.  Good performance with progression of dynamic stabilization emphasis.  No c/o increased discomfort with prescribed activities.   JESSICA is progressing well towards her goals.     Pt prognosis is Good.     Pt will continue to benefit from skilled outpatient physical therapy to address the deficits listed in the problem list box on initial evaluation, provide pt/family education and to maximize pt's level of independence in the home and community environment.     Pt's spiritual, cultural and educational needs considered and pt agreeable to plan of care and goals.     Anticipated barriers to physical therapy:     Short Term Goals (4 Weeks):   Updated 9/26/19  Partially MET    1.Pt to increase strength by a 1/2 grade of muscles test to allow for improvement in functional activities such as performing chores. MET  2.Pt to improve range of motion by 25% to allow for improved functional mobility to allow for improvement in IADLs. Not MET  3.Pt to report compliance with HEP and demonstrate proper exercise technique to PT to show competence with self management of condition. MET  4.Decrease pain by 25% during functional activities. Inconsistently MeT    Long Term Goals (12 Weeks):     1. Increase ROM to allow improved joint biomechanics during functional activities.   2.Increase trunk and lower extremity strength to within normal limits during functional activities.   3. Independent with home exercise program.   4. Full return to functional activities with manageable complaints.  5. Patient to  demonstrate improved posture and body mechanics.  6. Decrease pain by 75% during functional activities.       Plan       Recommended Treatment Plan: 2-3 times per week for 12 weeks with treatments to consist of:  Neuromuscular and postural re-education,  training, therapeutic exercise, therapeutic activities,balance training, gait training, manual therapy, soft tissue mobilization, ROM exercises, Cardiovascular,  Postural stabilization, manual traction, spinal mobilization, moist heat, cryotherapy, electrical stimulation, ultrasound, home exercise education and planning.    Continue with established Plan of Care towards PT goals.     Sebastián Zepeda, PT

## 2019-10-29 ENCOUNTER — CLINICAL SUPPORT (OUTPATIENT)
Dept: REHABILITATION | Facility: HOSPITAL | Age: 49
End: 2019-10-29
Payer: MEDICAID

## 2019-10-29 DIAGNOSIS — M25.60 JOINT STIFFNESS: ICD-10-CM

## 2019-10-29 DIAGNOSIS — M54.9 BACK PAIN, UNSPECIFIED BACK LOCATION, UNSPECIFIED BACK PAIN LATERALITY, UNSPECIFIED CHRONICITY: ICD-10-CM

## 2019-10-29 DIAGNOSIS — M62.81 MUSCLE WEAKNESS: ICD-10-CM

## 2019-10-29 PROCEDURE — 97110 THERAPEUTIC EXERCISES: CPT | Mod: PN

## 2020-12-15 ENCOUNTER — HOSPITAL ENCOUNTER (EMERGENCY)
Facility: OTHER | Age: 50
Discharge: HOME | End: 2020-12-15
Attending: EMERGENCY MEDICINE
Payer: MEDICAID

## 2020-12-15 VITALS
OXYGEN SATURATION: 100 % | RESPIRATION RATE: 18 BRPM | SYSTOLIC BLOOD PRESSURE: 137 MMHG | HEART RATE: 82 BPM | DIASTOLIC BLOOD PRESSURE: 81 MMHG | TEMPERATURE: 98 F | WEIGHT: 144 LBS | BODY MASS INDEX: 26.34 KG/M2

## 2020-12-15 DIAGNOSIS — S39.012A LUMBAR STRAIN, INITIAL ENCOUNTER: Primary | ICD-10-CM

## 2020-12-15 LAB
B-HCG UR QL: NEGATIVE
CTP QC/QA: YES

## 2020-12-15 PROCEDURE — 63600175 PHARM REV CODE 636 W HCPCS: Performed by: PHYSICIAN ASSISTANT

## 2020-12-15 PROCEDURE — 99284 EMERGENCY DEPT VISIT MOD MDM: CPT | Mod: 25

## 2020-12-15 PROCEDURE — 81025 URINE PREGNANCY TEST: CPT | Performed by: EMERGENCY MEDICINE

## 2020-12-15 PROCEDURE — 96372 THER/PROPH/DIAG INJ SC/IM: CPT

## 2020-12-15 RX ORDER — KETOROLAC TROMETHAMINE 30 MG/ML
30 INJECTION, SOLUTION INTRAMUSCULAR; INTRAVENOUS
Status: COMPLETED | OUTPATIENT
Start: 2020-12-15 | End: 2020-12-15

## 2020-12-15 RX ORDER — ORPHENADRINE CITRATE 30 MG/ML
30 INJECTION INTRAMUSCULAR; INTRAVENOUS
Status: COMPLETED | OUTPATIENT
Start: 2020-12-15 | End: 2020-12-15

## 2020-12-15 RX ORDER — METHOCARBAMOL 500 MG/1
1000 TABLET, FILM COATED ORAL 3 TIMES DAILY
Qty: 30 TABLET | Refills: 0 | Status: SHIPPED | OUTPATIENT
Start: 2020-12-15 | End: 2020-12-20

## 2020-12-15 RX ORDER — NAPROXEN 500 MG/1
500 TABLET ORAL 2 TIMES DAILY WITH MEALS
Qty: 20 TABLET | Refills: 0 | Status: SHIPPED | OUTPATIENT
Start: 2020-12-15

## 2020-12-15 RX ADMIN — KETOROLAC TROMETHAMINE 30 MG: 30 INJECTION, SOLUTION INTRAMUSCULAR at 11:12

## 2020-12-15 RX ADMIN — ORPHENADRINE CITRATE 30 MG: 60 INJECTION INTRAMUSCULAR; INTRAVENOUS at 11:12

## 2020-12-15 NOTE — FIRST PROVIDER EVALUATION
Emergency Department TeleTriage Encounter Note      CHIEF COMPLAINT    Chief Complaint   Patient presents with    Back Pain     +left lower back pain that radiates to groin x2 weeks. pt reports pain started 2 weeks ago after breaking up dog fight, pain unrelieved by gabapentin. pt requesting xray        VITAL SIGNS   Initial Vitals [12/15/20 1114]   BP Pulse Resp Temp SpO2   (!) 154/97 (!) 111 18 97.8 °F (36.6 °C) 100 %      MAP       --            ALLERGIES    Review of patient's allergies indicates:   Allergen Reactions    Bactrim [sulfamethoxazole-trimethoprim] Hives       PROVIDER TRIAGE NOTE  The patient has chronic back pain which was exacerbated by breaking up a dog fight 2 weeks ago.  Since then, she has had constant pain with significant flare ups despite ibuprofen 800 mg. she states that the pain radiates into her groin and down her leg.  It is consistent with previous episodes of radiculopathy.      ORDERS  Labs Reviewed   POCT URINE PREGNANCY       ED Orders (720h ago, onward)    Start Ordered     Status Ordering Provider    12/15/20 1119 12/15/20 1118  POCT urine pregnancy  Once      Ordered HUMAIRA OCHOA            Virtual Visit Note: The provider triage portion of this emergency department evaluation and documentation was performed via [employer], a HIPAA-compliant telemedicine application, in concert with a tele-presenter in the room. A face to face patient evaluation with one of my colleagues will occur once the patient is placed in an emergency department room.      DISCLAIMER: This note was prepared with [employer] voice recognition transcription software. Garbled syntax, mangled pronouns, and other bizarre constructions may be attributed to that software system.

## 2020-12-15 NOTE — ED NOTES
URINE COLLECTION PENDING: Pt states she can't void at this time. Sterile specimen container and urine clean catch instructions given to patient. Pt instructed to notify nurse immediately once urine specimen has been obtained. Pt verbalize understanding. Will continue to monitor.

## 2020-12-15 NOTE — ED PROVIDER NOTES
Encounter Date: 12/15/2020       History     Chief Complaint   Patient presents with    Back Pain     +left lower back pain that radiates to groin x2 weeks. pt reports pain started 2 weeks ago after breaking up dog fight, pain unrelieved by gabapentin. pt requesting xray        Patient is a 50-year-old female presenting to the emergency department for evaluation of left low back pain.  The patient states that her symptoms have been worsening over the past 2 weeks.  She states that 2 weeks ago she had to pull apart some dogs that were fighting.  She states that she developed some pain in her left low back.  She states that over the time, the pain has continued to worsen.  She states that now radiates from the top of her buttock into her left thigh.  She denies any fall or injury since then.  She admits that  her pain is most significant in the morning.  She has tried taking ibuprofen 800 mg as well as gabapentin with no improvement.  She did take an unknown pain medication from her neighbor.  She denies any loss of urinary bowel control.  She denies fever.  She denies any numbness or weakness of both of her lower extremities.This is the extent of the patient's complaints at this time.       The history is provided by the patient.     Review of patient's allergies indicates:   Allergen Reactions    Bactrim [sulfamethoxazole-trimethoprim] Hives     Past Medical History:   Diagnosis Date    Hypothyroid      Past Surgical History:   Procedure Laterality Date    BREAST SURGERY      CARPAL TUNNEL RELEASE      LIPOSUCTION      TONSILLECTOMY       No family history on file.  Social History     Tobacco Use    Smoking status: Current Every Day Smoker     Types: Cigarettes    Smokeless tobacco: Never Used   Substance Use Topics    Alcohol use: Yes     Comment: occasional    Drug use: No     Review of Systems   Constitutional: Negative for activity change, appetite change, chills, fatigue and fever.   HENT: Negative  for congestion, rhinorrhea and sore throat.    Eyes: Negative for photophobia and visual disturbance.   Respiratory: Negative for cough, shortness of breath and wheezing.    Cardiovascular: Negative for chest pain.   Gastrointestinal: Negative for abdominal pain, diarrhea, nausea and vomiting.   Genitourinary: Negative for dysuria, hematuria and urgency.   Musculoskeletal: Positive for back pain and myalgias. Negative for neck pain.   Skin: Negative for color change and wound.   Neurological: Negative for weakness and headaches.   Psychiatric/Behavioral: Negative for agitation and confusion.       Physical Exam     Initial Vitals [12/15/20 1114]   BP Pulse Resp Temp SpO2   (!) 154/97 (!) 111 18 97.8 °F (36.6 °C) 100 %      MAP       --         Physical Exam    Nursing note and vitals reviewed.  Constitutional: She appears well-developed and well-nourished. She is not diaphoretic. She is cooperative. No distress.   Well-appearing,  female accompanied in the emergency room.  Speaking clearly in full sentences.  No acute distress.   HENT:   Head: Normocephalic and atraumatic.   Right Ear: External ear normal.   Left Ear: External ear normal.   Nose: Nose normal.   Mouth/Throat: Oropharynx is clear and moist.   Eyes: Conjunctivae and EOM are normal.   Neck: Normal range of motion. Neck supple.   Cardiovascular: Normal rate.   Pulmonary/Chest: No respiratory distress.   Musculoskeletal: Normal range of motion.      Comments: Tenderness to palpation of the left-sided paraspinal muscles of the lumbar spine with no midline tenderness, crepitus, step-off.  No palpable deformity.  Tenderness of the left-sided SI joint and along the left posterior and lateral thigh.  No palpable deformity, crepitus, step-off.  No increased pain with internal or external rotation of left lower extremity.  Negative straight leg test bilaterally.  Normal strength, sensation, pulses left lower extremity.  Ambulatory weight-bearing.    Neurological: She is alert and oriented to person, place, and time. GCS eye subscore is 4. GCS verbal subscore is 5. GCS motor subscore is 6.   Skin: Skin is warm.   Psychiatric: She has a normal mood and affect. Her behavior is normal. Judgment and thought content normal.         ED Course   Procedures  Labs Reviewed   POCT URINE PREGNANCY             Medical Decision Making:   Initial Assessment:     Urgent evaluation a 50-year-old female presenting to the emergency department for evaluation of left low back pain x2 weeks.  Patient is afebrile, nontoxic appearing, hemodynamically stable.  Physical exam reveals tenderness palpation left-sided paraspinal muscles of the lumbar spine without midline tenderness, crepitus, step-off.  Tenderness of the left SI joint that radiates down the left lower extremity.  Ambulatory and weight-bearing. There are no signs of saddle anesthesia, incontinence, neurologic deficits, fevers, trauma or midline tenderness on history or physical to suggest cauda equina, infectious process, fracture or subluxation.     ED Management:    Given patient's history and physical exam findings, signs symptoms are most consistent with a musculoskeletal etiology.  No evidence to indicate fracture.  No neurological deficits.  Explained the patient that if she was concerned that she needs x-rays we can perform some however I do not have a high suspicion for an acute bony process.  Patient is in agreement.  Will go ahead and treat with NSAIDs and muscle relaxers.  Patient is given Toradol and Norflex in the emergency department will be discharged home with a prescription for naproxen Robaxin.  She is counseled on home care treatment.  Urged to obtain close follow up with PCP for further management possible referral to physical therapy.The patient was instructed to follow up with a primary care provider in 2 days or to return to the emergency department for worsening symptoms. The treatment plan was  discussed with the patient who demonstrated understanding and comfort with plan.    This note was created using [employer]. There may be typographical errors secondary to dictation.                                Clinical Impression:     ICD-10-CM ICD-9-CM   1. Lumbar strain, initial encounter  S39.012A 847.2                          ED Disposition Condition    Discharge Stable        ED Prescriptions     Medication Sig Dispense Start Date End Date Auth. Provider    naproxen (NAPROSYN) 500 MG tablet Take 1 tablet (500 mg total) by mouth 2 (two) times daily with meals. 20 tablet 12/15/2020  Zenia Zhu PA-C    methocarbamoL (ROBAXIN) 500 MG Tab Take 2 tablets (1,000 mg total) by mouth 3 (three) times daily. for 5 days 30 tablet 12/15/2020 12/20/2020 Zenia Zhu PA-C        Follow-up Information     Follow up With Specialties Details Why Contact Info    Idalmis Baumann MD Family Medicine Schedule an appointment as soon as possible for a visit   [address] [address]  [address]  [facility] [address]  [phone]      [facility] Emergency Medicine  If symptoms worsen [address] [id number] [phone]                                       Zenia Zhu PA-C  12/15/20 1212

## 2021-09-29 DIAGNOSIS — M47.896 OTHER OSTEOARTHRITIS OF SPINE, LUMBAR REGION: Primary | ICD-10-CM

## 2021-10-14 ENCOUNTER — CLINICAL SUPPORT (OUTPATIENT)
Dept: REHABILITATION | Facility: HOSPITAL | Age: 51
End: 2021-10-14
Payer: MEDICAID

## 2021-10-14 DIAGNOSIS — M25.511 CHRONIC RIGHT SHOULDER PAIN: ICD-10-CM

## 2021-10-14 DIAGNOSIS — M62.81 MUSCLE WEAKNESS: Primary | ICD-10-CM

## 2021-10-14 DIAGNOSIS — G89.29 CHRONIC RIGHT SHOULDER PAIN: ICD-10-CM

## 2021-10-14 PROCEDURE — 97110 THERAPEUTIC EXERCISES: CPT | Mod: PN

## 2021-10-14 PROCEDURE — 97161 PT EVAL LOW COMPLEX 20 MIN: CPT | Mod: PN

## 2021-11-09 ENCOUNTER — DOCUMENTATION ONLY (OUTPATIENT)
Dept: REHABILITATION | Facility: HOSPITAL | Age: 51
End: 2021-11-09
Payer: MEDICAID

## 2021-11-11 ENCOUNTER — DOCUMENTATION ONLY (OUTPATIENT)
Dept: REHABILITATION | Facility: HOSPITAL | Age: 51
End: 2021-11-11
Payer: MEDICAID

## 2022-08-27 ENCOUNTER — HOSPITAL ENCOUNTER (EMERGENCY)
Facility: HOSPITAL | Age: 52
Discharge: HOME | End: 2022-08-27
Attending: EMERGENCY MEDICINE
Payer: MEDICAID

## 2022-08-27 VITALS
OXYGEN SATURATION: 99 % | BODY MASS INDEX: 23.92 KG/M2 | TEMPERATURE: 98 F | SYSTOLIC BLOOD PRESSURE: 135 MMHG | HEART RATE: 60 BPM | RESPIRATION RATE: 17 BRPM | DIASTOLIC BLOOD PRESSURE: 80 MMHG | HEIGHT: 62 IN | WEIGHT: 130 LBS

## 2022-08-27 DIAGNOSIS — L03.012 PARONYCHIA, FINGER, LEFT: Primary | ICD-10-CM

## 2022-08-27 DIAGNOSIS — L73.9 FOLLICULITIS: ICD-10-CM

## 2022-08-27 LAB
CTP QC/QA: YES
SARS-COV-2 RDRP RESP QL NAA+PROBE: NEGATIVE

## 2022-08-27 PROCEDURE — U0002 COVID-19 LAB TEST NON-CDC: HCPCS | Performed by: NURSE PRACTITIONER

## 2022-08-27 PROCEDURE — 99283 EMERGENCY DEPT VISIT LOW MDM: CPT

## 2022-08-27 RX ORDER — CLINDAMYCIN HYDROCHLORIDE 150 MG/1
450 CAPSULE ORAL EVERY 8 HOURS
Qty: 63 CAPSULE | Refills: 0 | Status: SHIPPED | OUTPATIENT
Start: 2022-08-27 | End: 2022-09-03

## 2022-08-27 NOTE — DISCHARGE INSTRUCTIONS

## 2022-08-27 NOTE — ED TRIAGE NOTES
Pt arrived to the ED via personal transport due to possible monkey pox signs and symptoms. Pt reports symptoms started with a rash with small bumps noted bilateral forearms and ankles. Pt reports in last week bumps has spread to hands, feet, ankles, and forearms. Pt not sure if had skin-to-skin contact lately, possibly at work. Pt reports secondary fatigue and loss of appetite. Alert and oriented x4, stable, no distress noted.

## 2022-08-27 NOTE — ED PROVIDER NOTES
Encounter Date: 8/27/2022       History     Chief Complaint   Patient presents with    Sore     Pt c/o multiple small sores to araseli fingers and hands starting yesterday     52-year-old female with no pertinent past medical history presenting for evaluation of painful lesions to several fingers that began within the last couple of weeks.  Reports that 1 to the distal finger has been draining pus.  She is concerned that she may have monkey pox and would like to be tested.  No known monkey pox exposure.  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  She has not taken any medications or attempted any other treatments for her symptoms.    Review of patient's allergies indicates:   Allergen Reactions    Bactrim [sulfamethoxazole-trimethoprim] Hives     Past Medical History:   Diagnosis Date    Hypothyroid      Past Surgical History:   Procedure Laterality Date    BREAST SURGERY      CARPAL TUNNEL RELEASE      HYSTERECTOMY      LIPOSUCTION      TONSILLECTOMY       No family history on file.  Social History     Tobacco Use    Smoking status: Every Day     Packs/day: 1.00     Types: Cigarettes    Smokeless tobacco: Never   Substance Use Topics    Alcohol use: Yes     Comment: occasional    Drug use: No     Review of Systems   Constitutional:  Negative for chills, fatigue and fever.   HENT:  Negative for congestion, ear pain, nosebleeds, postnasal drip, rhinorrhea, sinus pressure and sore throat.    Eyes:  Negative for photophobia and pain.   Respiratory:  Negative for apnea, cough, choking, chest tightness, shortness of breath, wheezing and stridor.    Cardiovascular:  Negative for chest pain, palpitations and leg swelling.   Gastrointestinal:  Negative for abdominal pain, constipation, diarrhea, nausea and vomiting.   Genitourinary:  Negative for dysuria, flank pain, hematuria, pelvic pain and vaginal discharge.   Musculoskeletal:  Negative for arthralgias, back pain, gait problem and neck  pain.   Skin:  Positive for wound. Negative for color change, pallor and rash.   Neurological:  Negative for dizziness, seizures, syncope, facial asymmetry, light-headedness and headaches.   Hematological:  Negative for adenopathy.   Psychiatric/Behavioral:  Negative for confusion. The patient is not nervous/anxious.      Physical Exam     Initial Vitals [08/27/22 1123]   BP Pulse Resp Temp SpO2   (!) 137/92 60 16 98.2 °F (36.8 °C) 99 %      MAP       --         Physical Exam    Nursing note and vitals reviewed.  Constitutional: She appears well-developed and well-nourished. She is not diaphoretic. No distress.   HENT:   Head: Normocephalic and atraumatic.   Right Ear: External ear normal.   Left Ear: External ear normal.   Nose: Nose normal.   Eyes: Conjunctivae and EOM are normal. Right eye exhibits no discharge. Left eye exhibits no discharge.   Neck: Neck supple. No tracheal deviation present.   Normal range of motion.  Cardiovascular:  Normal rate.           Pulmonary/Chest: No stridor. No respiratory distress.   Abdominal: Abdomen is soft. She exhibits no distension. There is no abdominal tenderness.   Musculoskeletal:         General: No tenderness. Normal range of motion.      Cervical back: Normal range of motion and neck supple.     Neurological: She is alert and oriented to person, place, and time. She has normal strength. No cranial nerve deficit or sensory deficit.   Skin: Skin is warm and dry.   Several small circular lesions to the distal aspect of the hands and fingers bilaterally.  Appear consistent with folliculitis verses healing wounds.  There is also evidence of two paronychia to the left hand.   Psychiatric: She has a normal mood and affect. Her behavior is normal. Judgment and thought content normal.       ED Course   Procedures  Labs Reviewed   MONKEYPOX (ORTHOPOXVIRUS) PCR   SARS-COV-2 RDRP GENE    Narrative:     This test utilizes isothermal nucleic acid amplification   technology to detect  the SARS-CoV-2 RdRp nucleic acid segment.   The analytical sensitivity (limit of detection) is 125 genome   equivalents/mL.   A POSITIVE result implies infection with the SARS-CoV-2 virus;   the patient is presumed to be contagious.     A NEGATIVE result means that SARS-CoV-2 nucleic acids are not   present above the limit of detection. A NEGATIVE result should be   treated as presumptive. It does not rule out the possibility of   COVID-19 and should not be the sole basis for treatment decisions.   If COVID-19 is strongly suspected based on clinical and exposure   history, re-testing using an alternate molecular assay should be   considered.   This test is only for use under the Food and Drug   Administration s Emergency Use Authorization (EUA).   Commercial kits are provided by [employer].   Performance characteristics of the EUA have been independently   verified by [facility] Department of   Pathology and Laboratory Medicine.   _________________________________________________________________   The authorized Fact Sheet for Healthcare Providers and the authorized Fact   Sheet for Patients of the ID NOW COVID-19 are available on the FDA   website:     https://www.fda.gov/media/483578/download  https://www.fda.gov/media/255295/download                 Imaging Results    None          Medications - No data to display  Medical Decision Making:   History:   Old Medical Records: I decided to obtain old medical records.  Clinical Tests:   Lab Tests: Ordered  ED Management:  Symptoms appear to be secondary to paronychia.  There also several wounds.  Patient has had no viral prodrome and has no risk factors for monkey pox, however I will test for it out of an abundance of caution given the current nationwide outbreak.  Will treat with antibiotics.  No evidence of systemic infection/sepsis, flexor tenosynovitis, or other emergent pathology.  Advised to follow up with her PCP.  ED return precautions  given.  She expressed understanding.                    Clinical Impression:   Final diagnoses:  [L03.012] Paronychia, finger, left (Primary)  [L73.9] Folliculitis        ED Disposition Condition    Discharge Stable          ED Prescriptions       Medication Sig Dispense Start Date End Date Auth. Provider    clindamycin (CLEOCIN) 150 MG capsule Take 3 capsules (450 mg total) by mouth every 8 (eight) hours. for 7 days 63 capsule 8/27/2022 9/3/2022 James Loco NP          Follow-up Information       Follow up With Specialties Details Why Contact Info    Idalmis Baumann MD Family Medicine Schedule an appointment as soon as possible for a visit in 1 week For further evaluation [address]  [address]  [facility] [address]  [phone]      [facility] Emergency Dept Emergency Medicine Go to  If symptoms worsen, As needed [address] [facility] [address]  [phone]             James Loco NP  08/30/22 2914

## 2022-08-30 PROBLEM — M47.896 OTHER SPONDYLOSIS, LUMBAR REGION: Status: ACTIVE | Noted: 2019-02-12

## 2022-08-30 PROBLEM — S90.859A FOREIGN BODY IN FOOT: Status: ACTIVE | Noted: 2022-08-30

## 2022-08-30 PROBLEM — Z20.9 INFECTIOUS DISEASE CONTACT: Status: ACTIVE | Noted: 2021-08-17

## 2022-08-30 PROBLEM — Z90.710 HISTORY OF ROBOT-ASSISTED LAPAROSCOPIC HYSTERECTOMY: Status: ACTIVE | Noted: 2022-04-26

## 2022-08-30 PROBLEM — E03.9 HYPOTHYROIDISM: Status: ACTIVE | Noted: 2022-08-30

## 2022-08-30 PROBLEM — S05.00XA CORNEAL ABRASION: Status: ACTIVE | Noted: 2021-08-17

## 2022-09-01 LAB — NONVAR ORTHPX DNA SPEC QL NAA+PROBE: NORMAL

## 2022-09-23 ENCOUNTER — HOSPITAL ENCOUNTER (EMERGENCY)
Facility: HOSPITAL | Age: 52
Discharge: HOME | End: 2022-09-23
Attending: EMERGENCY MEDICINE
Payer: MEDICAID

## 2022-09-23 VITALS
HEART RATE: 66 BPM | BODY MASS INDEX: 23.92 KG/M2 | HEIGHT: 62 IN | DIASTOLIC BLOOD PRESSURE: 92 MMHG | SYSTOLIC BLOOD PRESSURE: 135 MMHG | RESPIRATION RATE: 16 BRPM | OXYGEN SATURATION: 100 % | TEMPERATURE: 99 F | WEIGHT: 130 LBS

## 2022-09-23 DIAGNOSIS — S39.011A STRAIN OF ABDOMINAL MUSCLE, INITIAL ENCOUNTER: ICD-10-CM

## 2022-09-23 DIAGNOSIS — S39.012A BACK STRAIN, INITIAL ENCOUNTER: Primary | ICD-10-CM

## 2022-09-23 LAB
ALBUMIN SERPL-MCNC: 3.7 G/DL (ref 3.3–5.5)
ALBUMIN SERPL-MCNC: 3.9 G/DL (ref 3.3–5.5)
ALP SERPL-CCNC: 76 U/L (ref 42–141)
ALP SERPL-CCNC: 82 U/L (ref 42–141)
BILIRUB SERPL-MCNC: 0.4 MG/DL (ref 0.2–1.6)
BILIRUB SERPL-MCNC: 0.4 MG/DL (ref 0.2–1.6)
BILIRUBIN, POC UA: NEGATIVE
BLOOD, POC UA: NEGATIVE
BUN SERPL-MCNC: 16 MG/DL (ref 7–22)
CALCIUM SERPL-MCNC: 9.6 MG/DL (ref 8–10.3)
CHLORIDE SERPL-SCNC: 101 MMOL/L (ref 98–108)
CLARITY, POC UA: CLEAR
COLOR, POC UA: YELLOW
CREAT SERPL-MCNC: 0.6 MG/DL (ref 0.6–1.2)
GLUCOSE SERPL-MCNC: 120 MG/DL (ref 73–118)
GLUCOSE, POC UA: NEGATIVE
KETONES, POC UA: ABNORMAL
LEUKOCYTE EST, POC UA: NEGATIVE
NITRITE, POC UA: NEGATIVE
PH UR STRIP: 5.5 [PH]
POC ALT (SGPT): 16 U/L (ref 10–47)
POC ALT (SGPT): 18 U/L (ref 10–47)
POC AMYLASE: 50 U/L (ref 14–97)
POC AST (SGOT): 24 U/L (ref 11–38)
POC AST (SGOT): 24 U/L (ref 11–38)
POC GGT: 26 U/L (ref 5–65)
POC TCO2: 27 MMOL/L (ref 18–33)
POTASSIUM BLD-SCNC: 3.7 MMOL/L (ref 3.6–5.1)
PROTEIN, POC UA: ABNORMAL
PROTEIN, POC: 7 G/DL (ref 6.4–8.1)
PROTEIN, POC: 7 G/DL (ref 6.4–8.1)
SODIUM BLD-SCNC: 141 MMOL/L (ref 128–145)
SPECIFIC GRAVITY, POC UA: >=1.03
UROBILINOGEN, POC UA: 0.2 E.U./DL

## 2022-09-23 PROCEDURE — 25000003 PHARM REV CODE 250: Mod: ER | Performed by: EMERGENCY MEDICINE

## 2022-09-23 PROCEDURE — 82150 ASSAY OF AMYLASE: CPT | Mod: ER

## 2022-09-23 PROCEDURE — 85025 COMPLETE CBC W/AUTO DIFF WBC: CPT | Mod: ER

## 2022-09-23 PROCEDURE — 81003 URINALYSIS AUTO W/O SCOPE: CPT | Mod: ER

## 2022-09-23 PROCEDURE — 80053 COMPREHEN METABOLIC PANEL: CPT | Mod: ER

## 2022-09-23 PROCEDURE — 99283 EMERGENCY DEPT VISIT LOW MDM: CPT | Mod: 25,ER

## 2022-09-23 RX ORDER — ACETAMINOPHEN 500 MG
500 TABLET ORAL
Status: COMPLETED | OUTPATIENT
Start: 2022-09-23 | End: 2022-09-23

## 2022-09-23 RX ORDER — CYCLOBENZAPRINE HCL 10 MG
10 TABLET ORAL 3 TIMES DAILY PRN
Qty: 15 TABLET | Refills: 0 | Status: SHIPPED | OUTPATIENT
Start: 2022-09-23 | End: 2022-09-28

## 2022-09-23 RX ORDER — ACETAMINOPHEN 500 MG
500 TABLET ORAL EVERY 6 HOURS PRN
Qty: 13 TABLET | Refills: 0 | Status: SHIPPED | OUTPATIENT
Start: 2022-09-23 | End: 2022-10-25 | Stop reason: CLARIF

## 2022-09-23 RX ORDER — CYCLOBENZAPRINE HCL 10 MG
10 TABLET ORAL
Status: COMPLETED | OUTPATIENT
Start: 2022-09-23 | End: 2022-09-23

## 2022-09-23 RX ADMIN — ACETAMINOPHEN 500 MG: 500 TABLET, FILM COATED ORAL at 02:09

## 2022-09-23 RX ADMIN — CYCLOBENZAPRINE 10 MG: 10 TABLET, FILM COATED ORAL at 02:09

## 2022-09-23 NOTE — ED PROVIDER NOTES
Encounter Date: 9/23/2022    SCRIBE #1 NOTE: I, Maiteame Vasquez, am scribing for, and in the presence of,  Martín Maldonado MD. I have scribed the following portions of the note - Other sections scribed: HPI, ROS, PE.     History     Chief Complaint   Patient presents with    Back Pain    Abdominal Pain     Back pain, onset yesterday, was moving house.  Today radiating to abd and pt feels cramping and bloating     52 year old female with history of Hypothyroid and Hysterectomy presents to the ED with complaints of back pain radiating to the rib cage beginning one day ago. Pt notes associated symptoms of abdominal pain and abdominal bloating. Denies recent trauma, rash, lesions, dysuria, frequency. No alleviating or exacerbating factors mentioned. Additionally pt notes she has been exhausted recently as she is moving houses. Pt reports taking Levothyroxine. Pt states she did not drive herself to the ED today. Allergic to Bactrim and Sulfa.     The history is provided by the patient. No  was used.   Review of patient's allergies indicates:   Allergen Reactions    Bactrim [sulfamethoxazole-trimethoprim] Hives    Sulfa (sulfonamide antibiotics) Hives     Past Medical History:   Diagnosis Date    Hypothyroid      Past Surgical History:   Procedure Laterality Date    BREAST SURGERY      CARPAL TUNNEL RELEASE      HYSTERECTOMY      LIPOSUCTION      TONSILLECTOMY       No family history on file.  Social History     Tobacco Use    Smoking status: Every Day     Packs/day: 1.00     Types: Cigarettes    Smokeless tobacco: Never   Substance Use Topics    Alcohol use: Yes     Comment: occasional    Drug use: No     Review of Systems   Constitutional:  Negative for chills and fever.   HENT:  Negative for congestion and sore throat.    Eyes:  Negative for pain and visual disturbance.   Respiratory:  Negative for chest tightness and shortness of breath.    Cardiovascular:  Negative for chest pain.    Gastrointestinal:  Positive for abdominal pain. Negative for nausea.        (+) Bloating   Endocrine: Negative for polydipsia and polyuria.   Genitourinary:  Negative for dysuria and flank pain.   Musculoskeletal:  Positive for back pain. Negative for neck pain and neck stiffness.   Skin:  Negative for rash.   Allergic/Immunologic: Negative for immunocompromised state.   Neurological:  Negative for dizziness and weakness.   Hematological:  Does not bruise/bleed easily.   Psychiatric/Behavioral:  Negative for agitation and behavioral problems.    All other systems reviewed and are negative.    Physical Exam     Initial Vitals [09/23/22 1322]   BP Pulse Resp Temp SpO2   119/77 75 19 98.4 °F (36.9 °C) 97 %      MAP       --         Physical Exam    Nursing note and vitals reviewed.  Constitutional: She appears well-developed and well-nourished.   HENT:   Head: Normocephalic and atraumatic.   Mouth/Throat: Oropharynx is clear and moist and mucous membranes are normal.   Eyes: Conjunctivae and EOM are normal. Pupils are equal, round, and reactive to light. Right conjunctiva is not injected. Left conjunctiva is not injected. No scleral icterus.   Neck: Neck supple.   Normal range of motion.   Full passive range of motion without pain.     Cardiovascular:  Normal rate, regular rhythm, S1 normal, S2 normal, normal heart sounds and normal pulses.     Exam reveals no gallop and no friction rub.       No murmur heard.  Pulses:       Radial pulses are 2+ on the right side and 2+ on the left side.   Pulmonary/Chest: Effort normal and breath sounds normal. No respiratory distress.   Bilateral rib tenderness.    Abdominal: Abdomen is soft. She exhibits no distension. There is no abdominal tenderness.   Musculoskeletal:         General: No edema. Normal range of motion.      Cervical back: Full passive range of motion without pain, normal range of motion and neck supple.      Comments: Good active ROM of all extremities. No lower  extremity edema or cyanosis.   Perispinal back tenderness.      Neurological: No cranial nerve deficit. Gait normal.   A&Ox4, normal speech.   Skin: Skin is warm. No ecchymosis and no rash noted.   Psychiatric: She has a normal mood and affect. Thought content normal.       ED Course   Procedures  Labs Reviewed   POCT URINALYSIS W/O SCOPE - Abnormal; Notable for the following components:       Result Value    Ketones, UA Trace (*)     Spec Grav UA >=1.030 (*)     Protein, UA Trace (*)     All other components within normal limits   POCT CMP - Abnormal; Notable for the following components:    POC Glucose 120 (*)     All other components within normal limits   POCT URINALYSIS W/O SCOPE   POCT CBC   POCT CMP   POCT LIVER PANEL   POCT LIVER PANEL          Imaging Results              X-ray chest PA and lateral (Final result)  Result time 09/23/22 14:37:11      Final result by Segundo Thompson MD (09/23/22 14:37:11)                   Impression:      No radiographic acute intrathoracic process seen.      Electronically signed by: Segundo Thompson MD  Date:    09/23/2022  Time:    14:37               Narrative:    EXAMINATION:  XR CHEST PA AND LATERAL    CLINICAL HISTORY:  Other (add clinical information to comments box below);    TECHNIQUE:  PA and lateral views of the chest were performed.    COMPARISON:  Abdominal series 02/06/2011    FINDINGS:  Patient is slightly rotated.  Trachea is slightly deviated towards the right likely 2nd to the arch but remains patent.The lungs are symmetrically well expanded.  Bibasilar minimal scattered linear opacities consistent with platelike scarring versus atelectasis.  No consolidation, pleural effusion or pneumothorax.    The cardiac silhouette is normal in size. Pulmonary vasculature and hilar and mediastinal contours are within normal limits.    Osseous structures show minimal to mild degenerative change and chronic appearing minimal anterior wedge deformity of a few lower thoracic  and upper lumbar vertebral bodies without acute process seen.                                       Medications   acetaminophen tablet 500 mg (500 mg Oral Given 9/23/22 1441)   cyclobenzaprine tablet 10 mg (10 mg Oral Given 9/23/22 1441)     Medical Decision Making:   Initial Assessment:   52-year-old female presenting today secondary to back pain is extending to her ribs after lifting moving like things.  Exam is reassuring.  No major tenderness.  This is really more consistent with muscle strain.  No signs of bruising.  Good distal pulses.  Doubt acute intra-abdominal process.  Afebrile.  Labs reassuring.  No acute kidney injury.  Lower suspicion of rhabdo.  Chest x-ray reassuring.  Martín Maldonado    Clinical Tests:   Lab Tests: Ordered and Reviewed        Scribe Attestation:   Scribe #1: I performed the above scribed service and the documentation accurately describes the services I performed. I attest to the accuracy of the note.                 I, Martín Maldonado, personally performed the services described in this documentation. All medical record entries made by the scribe were at my direction and in my presence. I have reviewed the chart and agree that the record reflects my personal performance and is accurate and complete.    Clinical Impression:   Final diagnoses:  [S39.012A] Back strain, initial encounter (Primary)  [S39.011A] Strain of abdominal muscle, initial encounter      ED Disposition Condition    Discharge Stable          ED Prescriptions       Medication Sig Dispense Start Date End Date Auth. Provider    acetaminophen (TYLENOL) 500 MG tablet Take 1 tablet (500 mg total) by mouth every 6 (six) hours as needed. 13 tablet 9/23/2022 -- Martín Maldonado MD    cyclobenzaprine (FLEXERIL) 10 MG tablet Take 1 tablet (10 mg total) by mouth 3 (three) times daily as needed. 15 tablet 9/23/2022 9/28/2022 Martín Maldonado MD          Follow-up Information       Follow up With Specialties Details Why Contact Info     Idalmis Baumann MD Family Medicine Schedule an appointment as soon as possible for a visit in 2 days  [address]  [address]  [facility] [address]  [phone]               Martín Maldonado MD  09/23/22 6371

## 2022-09-23 NOTE — Clinical Note
"Shirley"CHOLO HADLEY was seen and treated in our emergency department on 9/23/2022.  She may return to work on 09/26/2022.       If you have any questions or concerns, please don't hesitate to call.      Martín Maldonado MD"

## 2022-09-23 NOTE — DISCHARGE INSTRUCTIONS
Thank you for coming to our Emergency Department today. It is important to remember that some problems are difficult to diagnose and may not be found during your first visit. Be sure to follow up with your primary care doctor and review any labs/imaging that was performed with them. If you do not have a primary care doctor, you may contact the one listed on your discharge paperwork or you may also call the [facility] Appointment Desk at [phone] to schedule an appointment with one.     All medications may potentially have side effects and it is impossible to predict which medications may give you side effects. If you feel that you are having a negative effect of any medication you should immediately stop taking them and seek medical attention.    Return to the ER with any questions/concerns, new/concerning symptoms, worsening or failure to improve. Do not drive or make any important decisions for 24 hours if you have received any pain medications, sedatives or mood altering drugs during your ER visit.

## 2022-10-25 ENCOUNTER — HOSPITAL ENCOUNTER (OUTPATIENT)
Dept: PREADMISSION TESTING | Facility: OTHER | Age: 52
Discharge: HOME | End: 2022-10-25
Attending: PLASTIC SURGERY
Payer: MEDICAID

## 2022-10-25 ENCOUNTER — ANESTHESIA EVENT (OUTPATIENT)
Dept: SURGERY | Facility: OTHER | Age: 52
End: 2022-10-25

## 2022-10-25 VITALS
DIASTOLIC BLOOD PRESSURE: 70 MMHG | HEART RATE: 80 BPM | TEMPERATURE: 98 F | BODY MASS INDEX: 25.4 KG/M2 | WEIGHT: 138 LBS | OXYGEN SATURATION: 97 % | HEIGHT: 62 IN | RESPIRATION RATE: 18 BRPM | SYSTOLIC BLOOD PRESSURE: 111 MMHG

## 2022-10-25 RX ORDER — PREGABALIN 50 MG/1
50 CAPSULE ORAL ONCE
Status: CANCELLED | OUTPATIENT
Start: 2022-10-25 | End: 2022-10-25

## 2022-10-25 RX ORDER — SODIUM CHLORIDE, SODIUM LACTATE, POTASSIUM CHLORIDE, CALCIUM CHLORIDE 600; 310; 30; 20 MG/100ML; MG/100ML; MG/100ML; MG/100ML
INJECTION, SOLUTION INTRAVENOUS CONTINUOUS
Status: CANCELLED | OUTPATIENT
Start: 2022-10-25

## 2022-10-25 RX ORDER — ACETAMINOPHEN 500 MG
1000 TABLET ORAL
Status: CANCELLED | OUTPATIENT
Start: 2022-10-25 | End: 2022-10-25

## 2022-10-25 RX ORDER — LIDOCAINE HYDROCHLORIDE 10 MG/ML
0.5 INJECTION, SOLUTION EPIDURAL; INFILTRATION; INTRACAUDAL; PERINEURAL ONCE
Status: CANCELLED | OUTPATIENT
Start: 2022-10-25 | End: 2022-10-25

## 2022-10-25 RX ORDER — CYCLOBENZAPRINE HCL 10 MG
10 TABLET ORAL 3 TIMES DAILY PRN
COMMUNITY

## 2022-10-25 NOTE — ANESTHESIA PREPROCEDURE EVALUATION
10/25/2022  Shirley HADLEY is a 52 y.o., female.      Pre-op Assessment    I have reviewed the Patient Summary Reports.     I have reviewed the Nursing Notes. I have reviewed the NPO Status.   I have reviewed the Medications.     Review of Systems  Anesthesia Hx:  Denies Family Hx of Anesthesia complications.   Denies Personal Hx of Anesthesia complications.   Social:  Non-Smoker    Hematology/Oncology:  Hematology Normal   Oncology Normal     EENT/Dental:EENT/Dental Normal   Cardiovascular:  Cardiovascular Normal Exercise tolerance: good     Pulmonary:  Pulmonary Normal    Renal/:  Renal/ Normal     Hepatic/GI:  Hepatic/GI Normal    Musculoskeletal:   Arthritis     Neurological:  Neurology Normal    Endocrine:   Hypothyroidism    Dermatological:  Skin Normal    Psych:  Psychiatric Normal           Physical Exam  General: Well nourished, Cooperative, Alert and Oriented    Airway:  Mallampati: II   Mouth Opening: Small, but > 3cm  TM Distance: Normal  Neck ROM: Normal ROM    Dental:  Intact        Anesthesia Plan  Type of Anesthesia, risks & benefits discussed:    Anesthesia Type: Gen ETT  Intra-op Monitoring Plan: Standard ASA Monitors  Post Op Pain Control Plan: multimodal analgesia and IV/PO Opioids PRN  Induction:  IV  Airway Plan: Video  Informed Consent: Informed consent signed with the Patient and all parties understand the risks and agree with anesthesia plan.  All questions answered.   ASA Score: 2    Ready For Surgery From Anesthesia Perspective.     .

## 2022-10-25 NOTE — DISCHARGE INSTRUCTIONS
Information to Prepare you for your Surgery    PRE-ADMIT TESTING -  [phone]    [address] [facility]          Your surgery has been scheduled at [facility]. We are pleased to have the opportunity to serve you. For Further Information please call [phone].    On the day of surgery please report to the Information Desk on the 1st floor.    CONTACT YOUR PHYSICIAN'S OFFICE THE DAY PRIOR TO YOUR SURGERY TO OBTAIN YOUR ARRIVAL TIME.     The evening before surgery do not eat anything after 9 p.m. ( this includes hard candy, chewing gum and mints).  You may only have GATORADE, POWERADE AND WATER  from 9 p.m. until you leave your home.   DO NOT DRINK ANY LIQUIDS ON THE WAY TO THE HOSPITAL.      Why does your anesthesiologist allow you to drink Gatorade/Powerade before surgery?  Gatorade/Powerade helps to increase your comfort before surgery and to decrease your nausea after surgery. The carbohydrates in Gatorade/Powerade help reduce your body's stress response to surgery.  If you are a diabetic-drink only water prior to surgery.      Current Visitor policy(12/27/2021) - Patients may have 2 visitors pre and post procedure. Only 2 visitors will be allowed in the Surgical building with the patient.     SPECIAL MEDICATION INSTRUCTIONS: TAKE medications checked off by the Anesthesiologist on your Medication List.    Angiogram Patients: Take medications as instructed by your physician, including aspirin.     Surgery Patients:    If you take ASPIRIN - Your PHYSICIAN/SURGEON will need to inform you IF/OR when you need to stop taking aspirin prior to your surgery.     The week prior to surgery do not ot take any medications containing IBUPROFEN or NSAIDS ( Advil, Motrin, Goodys, BC, Aleve, Naproxen etc) If you are not sure if you should take a medicine please call your surgeon's office.  Ok to take Tylenol    Do Not Wear any make-up (especially eye make-up) to  surgery. Please remove any false eyelashes or eyelash extensions. If you arrive the day of surgery with makeup/eyelashes on you will be required to remove prior to surgery. (There is a risk of corneal abrasions if eye makeup/eyelash extensions are not removed)      Leave all valuables at home.   Do Not wear any jewelry or watches, including any metal in body piercings. Jewelry must be removed prior to coming to the hospital.  There is a possibility that rings that are unable to be removed may be cut off if they are on the surgical extremity.    Please remove all hair extensions, wigs, clips and any other metal accessories/ ornaments from your hair.  These items may pose a flammable/fire risk in Surgery and must be removed.    Do not shave your surgical area at least 5 days prior to your surgery. The surgical prep will be performed at the hospital according to Infection Control regulations.    Contact Lens must be removed before surgery. Either do not wear the contact lens or bring a case and solution for storage.  Please bring a container for eyeglasses or dentures as required.  Bring any paperwork your physician has provided, such as consent forms,  history and physicals, doctor's orders, etc.   Bring comfortable clothes that are loose fitting to wear upon discharge. Take into consideration the type of surgery being performed.  Maintain your diet as advised per your physician the day prior to surgery.      Adequate rest the night before surgery is advised.   Park in the Parking lot behind the hospital or in the [facility] across the street from the parking lot. Parking is complimentary.  If you will be discharged the same day as your procedure, please arrange for a responsible adult to drive you home or to accompany you if traveling by taxi.   YOU WILL NOT BE PERMITTED TO DRIVE OR TO LEAVE THE HOSPITAL ALONE AFTER SURGERY.   If you are being discharged the same day, it is strongly recommended that you  arrange for someone to remain with you for the first 24 hrs following your surgery.    The Surgeon will speak to your family/visitor after your surgery regarding the outcome of your surgery and post op care.  The Surgeon may speak to you after your surgery, but there is a possibility you may not remember the details.  Please check with your family members regarding the conversation with the Surgeon.    We strongly recommend whoever is bringing you home be present for discharge instructions.  This will ensure a thorough understanding for your post op home care.    ALL CHILDREN MUST ALWAYS BE ACCOMPANIED BY AN ADULT.    Visitors-Refer to current Visitor policy handouts.    Thank you for your cooperation.  The Staff of [facility].            Bathing Instructions with Hibiclens    Shower the evening before and morning of your procedure with Chlorhexidine (Hibiclens)  do not use Chlorhexidine on your face or genitals. Do not get in your eyes.  Wash your face with water and your regular face wash/soap  Use your regular shampoo  Apply Chlorhexidine (Hibiclens) directly on your skin or on a wet washcloth and wash gently. When showering: Move away from the shower stream when applying Chlorhexidine (Hibiclens) to avoid rinsing off too soon.  Rinse thoroughly with warm water  Do not dilute Chlorhexidine (Hibiclens)   Dry off as usual, do not use any deodorant, powder, body lotions, perfume, after shave or cologne.

## 2022-10-31 ENCOUNTER — ANESTHESIA (OUTPATIENT)
Dept: SURGERY | Facility: OTHER | Age: 52
End: 2022-10-31

## 2022-10-31 ENCOUNTER — HOSPITAL ENCOUNTER (OUTPATIENT)
Facility: OTHER | Age: 52
Discharge: HOME | End: 2022-10-31
Attending: PLASTIC SURGERY | Admitting: PLASTIC SURGERY

## 2022-10-31 DIAGNOSIS — L98.7 EXCESS SKIN: Primary | ICD-10-CM

## 2022-10-31 PROCEDURE — 71000039 HC RECOVERY, EACH ADD'L HOUR: Performed by: PLASTIC SURGERY

## 2022-10-31 PROCEDURE — 71000016 HC POSTOP RECOV ADDL HR: Performed by: PLASTIC SURGERY

## 2022-10-31 PROCEDURE — 71000033 HC RECOVERY, INTIAL HOUR: Performed by: PLASTIC SURGERY

## 2022-10-31 PROCEDURE — 63600175 PHARM REV CODE 636 W HCPCS: Performed by: ANESTHESIOLOGY

## 2022-10-31 PROCEDURE — 37000009 HC ANESTHESIA EA ADD 15 MINS: Performed by: PLASTIC SURGERY

## 2022-10-31 PROCEDURE — 63600175 PHARM REV CODE 636 W HCPCS: Performed by: NURSE ANESTHETIST, CERTIFIED REGISTERED

## 2022-10-31 PROCEDURE — 37000008 HC ANESTHESIA 1ST 15 MINUTES: Performed by: PLASTIC SURGERY

## 2022-10-31 PROCEDURE — 63600175 PHARM REV CODE 636 W HCPCS: Performed by: PLASTIC SURGERY

## 2022-10-31 PROCEDURE — 71000015 HC POSTOP RECOV 1ST HR: Performed by: PLASTIC SURGERY

## 2022-10-31 PROCEDURE — 36000707: Performed by: PLASTIC SURGERY

## 2022-10-31 PROCEDURE — 25000003 PHARM REV CODE 250: Performed by: PLASTIC SURGERY

## 2022-10-31 PROCEDURE — A6011 COLLAGEN GEL/PASTE WOUND FIL: HCPCS | Performed by: PLASTIC SURGERY

## 2022-10-31 PROCEDURE — C1729 CATH, DRAINAGE: HCPCS | Performed by: PLASTIC SURGERY

## 2022-10-31 PROCEDURE — 25000003 PHARM REV CODE 250: Performed by: NURSE ANESTHETIST, CERTIFIED REGISTERED

## 2022-10-31 PROCEDURE — 25000003 PHARM REV CODE 250: Performed by: ANESTHESIOLOGY

## 2022-10-31 PROCEDURE — 36000706: Performed by: PLASTIC SURGERY

## 2022-10-31 RX ORDER — MIDAZOLAM HYDROCHLORIDE 1 MG/ML
INJECTION INTRAMUSCULAR; INTRAVENOUS
Status: DISCONTINUED | OUTPATIENT
Start: 2022-10-31 | End: 2022-10-31

## 2022-10-31 RX ORDER — CEPHALEXIN 500 MG/1
500 CAPSULE ORAL EVERY 12 HOURS
Qty: 14 CAPSULE | Refills: 0 | Status: SHIPPED | OUTPATIENT
Start: 2022-10-31

## 2022-10-31 RX ORDER — DIPHENHYDRAMINE HYDROCHLORIDE 50 MG/ML
12.5 INJECTION INTRAMUSCULAR; INTRAVENOUS EVERY 30 MIN PRN
Status: DISCONTINUED | OUTPATIENT
Start: 2022-10-31 | End: 2022-10-31 | Stop reason: HOSPADM

## 2022-10-31 RX ORDER — LIDOCAINE HCL/PF 100 MG/5ML
SYRINGE (ML) INTRAVENOUS
Status: DISCONTINUED | OUTPATIENT
Start: 2022-10-31 | End: 2022-10-31

## 2022-10-31 RX ORDER — OXYCODONE AND ACETAMINOPHEN 7.5; 325 MG/1; MG/1
1 TABLET ORAL EVERY 4 HOURS PRN
Qty: 30 TABLET | Refills: 0 | Status: SHIPPED | OUTPATIENT
Start: 2022-10-31

## 2022-10-31 RX ORDER — SODIUM CHLORIDE, SODIUM LACTATE, POTASSIUM CHLORIDE, CALCIUM CHLORIDE 600; 310; 30; 20 MG/100ML; MG/100ML; MG/100ML; MG/100ML
INJECTION, SOLUTION INTRAVENOUS CONTINUOUS
Status: DISCONTINUED | OUTPATIENT
Start: 2022-10-31 | End: 2022-10-31 | Stop reason: HOSPADM

## 2022-10-31 RX ORDER — HYDROMORPHONE HYDROCHLORIDE 2 MG/ML
0.4 INJECTION, SOLUTION INTRAMUSCULAR; INTRAVENOUS; SUBCUTANEOUS EVERY 5 MIN PRN
Status: DISCONTINUED | OUTPATIENT
Start: 2022-10-31 | End: 2022-10-31 | Stop reason: HOSPADM

## 2022-10-31 RX ORDER — SODIUM CHLORIDE 0.9 % (FLUSH) 0.9 %
3 SYRINGE (ML) INJECTION
Status: DISCONTINUED | OUTPATIENT
Start: 2022-10-31 | End: 2022-10-31 | Stop reason: HOSPADM

## 2022-10-31 RX ORDER — DEXAMETHASONE SODIUM PHOSPHATE 4 MG/ML
INJECTION, SOLUTION INTRA-ARTICULAR; INTRALESIONAL; INTRAMUSCULAR; INTRAVENOUS; SOFT TISSUE
Status: DISCONTINUED | OUTPATIENT
Start: 2022-10-31 | End: 2022-10-31

## 2022-10-31 RX ORDER — LIDOCAINE HYDROCHLORIDE AND EPINEPHRINE 10; 10 MG/ML; UG/ML
INJECTION, SOLUTION INFILTRATION; PERINEURAL
Status: DISCONTINUED | OUTPATIENT
Start: 2022-10-31 | End: 2022-10-31 | Stop reason: HOSPADM

## 2022-10-31 RX ORDER — PROCHLORPERAZINE EDISYLATE 5 MG/ML
5 INJECTION INTRAMUSCULAR; INTRAVENOUS EVERY 30 MIN PRN
Status: DISCONTINUED | OUTPATIENT
Start: 2022-10-31 | End: 2022-10-31 | Stop reason: HOSPADM

## 2022-10-31 RX ORDER — ONDANSETRON 2 MG/ML
INJECTION INTRAMUSCULAR; INTRAVENOUS
Status: DISCONTINUED | OUTPATIENT
Start: 2022-10-31 | End: 2022-10-31

## 2022-10-31 RX ORDER — DIPHENHYDRAMINE HYDROCHLORIDE 50 MG/ML
INJECTION INTRAMUSCULAR; INTRAVENOUS
Status: DISCONTINUED | OUTPATIENT
Start: 2022-10-31 | End: 2022-10-31

## 2022-10-31 RX ORDER — PROPOFOL 10 MG/ML
VIAL (ML) INTRAVENOUS
Status: DISCONTINUED | OUTPATIENT
Start: 2022-10-31 | End: 2022-10-31

## 2022-10-31 RX ORDER — LIDOCAINE HYDROCHLORIDE 10 MG/ML
0.5 INJECTION, SOLUTION EPIDURAL; INFILTRATION; INTRACAUDAL; PERINEURAL ONCE
Status: DISCONTINUED | OUTPATIENT
Start: 2022-10-31 | End: 2022-10-31 | Stop reason: HOSPADM

## 2022-10-31 RX ORDER — CEFAZOLIN SODIUM 1 G/3ML
2 INJECTION, POWDER, FOR SOLUTION INTRAMUSCULAR; INTRAVENOUS
Status: COMPLETED | OUTPATIENT
Start: 2022-10-31 | End: 2022-10-31

## 2022-10-31 RX ORDER — PHENYLEPHRINE HYDROCHLORIDE 10 MG/ML
INJECTION INTRAVENOUS
Status: DISCONTINUED | OUTPATIENT
Start: 2022-10-31 | End: 2022-10-31

## 2022-10-31 RX ORDER — ACETAMINOPHEN 500 MG
1000 TABLET ORAL
Status: COMPLETED | OUTPATIENT
Start: 2022-10-31 | End: 2022-10-31

## 2022-10-31 RX ORDER — FENTANYL CITRATE 50 UG/ML
INJECTION, SOLUTION INTRAMUSCULAR; INTRAVENOUS
Status: DISCONTINUED | OUTPATIENT
Start: 2022-10-31 | End: 2022-10-31

## 2022-10-31 RX ORDER — PREGABALIN 50 MG/1
50 CAPSULE ORAL ONCE
Status: COMPLETED | OUTPATIENT
Start: 2022-10-31 | End: 2022-10-31

## 2022-10-31 RX ORDER — OXYCODONE HYDROCHLORIDE 5 MG/1
5 TABLET ORAL
Status: DISCONTINUED | OUTPATIENT
Start: 2022-10-31 | End: 2022-10-31 | Stop reason: HOSPADM

## 2022-10-31 RX ORDER — ROCURONIUM BROMIDE 10 MG/ML
INJECTION, SOLUTION INTRAVENOUS
Status: DISCONTINUED | OUTPATIENT
Start: 2022-10-31 | End: 2022-10-31

## 2022-10-31 RX ORDER — KETAMINE HCL IN 0.9 % NACL 50 MG/5 ML
SYRINGE (ML) INTRAVENOUS
Status: DISCONTINUED | OUTPATIENT
Start: 2022-10-31 | End: 2022-10-31

## 2022-10-31 RX ORDER — BACITRACIN ZINC 500 UNIT/G
OINTMENT (GRAM) TOPICAL
Status: DISCONTINUED | OUTPATIENT
Start: 2022-10-31 | End: 2022-10-31 | Stop reason: HOSPADM

## 2022-10-31 RX ADMIN — SODIUM CHLORIDE, SODIUM LACTATE, POTASSIUM CHLORIDE, AND CALCIUM CHLORIDE: 600; 310; 30; 20 INJECTION, SOLUTION INTRAVENOUS at 08:10

## 2022-10-31 RX ADMIN — HYDROMORPHONE HYDROCHLORIDE 0.4 MG: 2 INJECTION INTRAMUSCULAR; INTRAVENOUS; SUBCUTANEOUS at 04:10

## 2022-10-31 RX ADMIN — CEFAZOLIN 2 G: 330 INJECTION, POWDER, FOR SOLUTION INTRAMUSCULAR; INTRAVENOUS at 10:10

## 2022-10-31 RX ADMIN — SODIUM CHLORIDE, SODIUM LACTATE, POTASSIUM CHLORIDE, AND CALCIUM CHLORIDE: 600; 310; 30; 20 INJECTION, SOLUTION INTRAVENOUS at 01:10

## 2022-10-31 RX ADMIN — DEXAMETHASONE SODIUM PHOSPHATE 8 MG: 4 INJECTION, SOLUTION INTRAMUSCULAR; INTRAVENOUS at 10:10

## 2022-10-31 RX ADMIN — ROCURONIUM BROMIDE 20 MG: 10 SOLUTION INTRAVENOUS at 11:10

## 2022-10-31 RX ADMIN — CEFAZOLIN 2 G: 330 INJECTION, POWDER, FOR SOLUTION INTRAMUSCULAR; INTRAVENOUS at 02:10

## 2022-10-31 RX ADMIN — OXYCODONE 5 MG: 5 TABLET ORAL at 03:10

## 2022-10-31 RX ADMIN — FENTANYL CITRATE 50 MCG: 50 INJECTION, SOLUTION INTRAMUSCULAR; INTRAVENOUS at 12:10

## 2022-10-31 RX ADMIN — HYDROMORPHONE HYDROCHLORIDE 0.4 MG: 2 INJECTION INTRAMUSCULAR; INTRAVENOUS; SUBCUTANEOUS at 03:10

## 2022-10-31 RX ADMIN — PROPOFOL 200 MG: 10 INJECTION, EMULSION INTRAVENOUS at 10:10

## 2022-10-31 RX ADMIN — LIDOCAINE HYDROCHLORIDE 100 MG: 20 INJECTION, SOLUTION INTRAVENOUS at 10:10

## 2022-10-31 RX ADMIN — ROCURONIUM BROMIDE 50 MG: 10 SOLUTION INTRAVENOUS at 10:10

## 2022-10-31 RX ADMIN — Medication 50 MG: at 10:10

## 2022-10-31 RX ADMIN — PHENYLEPHRINE HYDROCHLORIDE 50 MCG: 10 INJECTION INTRAVENOUS at 12:10

## 2022-10-31 RX ADMIN — FENTANYL CITRATE 50 MCG: 50 INJECTION, SOLUTION INTRAMUSCULAR; INTRAVENOUS at 02:10

## 2022-10-31 RX ADMIN — DIPHENHYDRAMINE HYDROCHLORIDE 12.5 MG: 50 INJECTION, SOLUTION INTRAMUSCULAR; INTRAVENOUS at 10:10

## 2022-10-31 RX ADMIN — ACETAMINOPHEN 1000 MG: 500 TABLET, FILM COATED ORAL at 07:10

## 2022-10-31 RX ADMIN — MIDAZOLAM HYDROCHLORIDE 2 MG: 1 INJECTION, SOLUTION INTRAMUSCULAR; INTRAVENOUS at 10:10

## 2022-10-31 RX ADMIN — FENTANYL CITRATE 100 MCG: 50 INJECTION, SOLUTION INTRAMUSCULAR; INTRAVENOUS at 10:10

## 2022-10-31 RX ADMIN — SODIUM CHLORIDE, SODIUM LACTATE, POTASSIUM CHLORIDE, AND CALCIUM CHLORIDE: 600; 310; 30; 20 INJECTION, SOLUTION INTRAVENOUS at 11:10

## 2022-10-31 RX ADMIN — PREGABALIN 50 MG: 50 CAPSULE ORAL at 07:10

## 2022-10-31 RX ADMIN — ONDANSETRON HYDROCHLORIDE 4 MG: 2 INJECTION INTRAMUSCULAR; INTRAVENOUS at 10:10

## 2022-10-31 NOTE — ANESTHESIA PROCEDURE NOTES
Intubation    Date/Time: 10/31/2022 10:29 AM  Performed by: Kelly Booth CRNA  Authorized by: Rigoberto Castillo MD     Intubation:     Induction:  Intravenous    Intubated:  Postinduction    Attempted By:  CRNA    Method of Intubation:  Video laryngoscopy    Blade:  Sutton 3    Laryngeal View Grade: Grade I - full view of cords      Difficult Airway Encountered?: No      Complications:  None    Airway Device:  Oral endotracheal tube    Airway Device Size:  7.0    Style/Cuff Inflation:  Cuffed (inflated to minimal occlusive pressure)    Tube secured:  22    Secured at:  The lips (per surgeon to gums)    Placement Verified By:  Capnometry    Complicating Factors:  None    Findings Post-Intubation:  BS equal bilateral and atraumatic/condition of teeth unchanged

## 2022-10-31 NOTE — TRANSFER OF CARE
"Anesthesia Transfer of Care Note    Patient: Shirley Wakefield    Procedure(s) Performed: Procedure(s) (LRB):  PLATYSMAPLASTY /  full necklift with skin only (N/A)  RHYTIDECTOMY, FACE / face lift and fat transfer (N/A)  LIPOSUCTION, WITH FAT TRANSFER (N/A)    Patient location: PACU    Anesthesia Type: general    Transport from OR: Transported from OR on 2-3 L/min O2 by NC with adequate spontaneous ventilation    Post pain: adequate analgesia    Post assessment: no apparent anesthetic complications    Post vital signs: stable    Level of consciousness: awake and responds to stimulation    Nausea/Vomiting: no nausea/vomiting    Complications: none    Transfer of care protocol was followed      Last vitals:   Visit Vitals  BP (!) 133/93 (BP Location: Left arm, Patient Position: Lying)   Pulse 61   Temp 36.6 °C (97.9 °F) (Oral)   Resp 18   Ht 5' 2" (1.575 m)   Wt 62.6 kg (138 lb)   LMP  (LMP Unknown)   SpO2 98%   Breastfeeding No   BMI 25.24 kg/m²     "

## 2022-10-31 NOTE — ANESTHESIA POSTPROCEDURE EVALUATION
Anesthesia Post Evaluation    Patient: Shirley Wakefield    Procedure(s) Performed: Procedure(s) (LRB):  PLATYSMAPLASTY /  full necklift with skin only (N/A)  RHYTIDECTOMY, FACE / face lift and fat transfer (N/A)  LIPOSUCTION, WITH FAT TRANSFER (N/A)    Final Anesthesia Type: general      Patient location during evaluation: PACU  Patient participation: Yes- Able to Participate  Level of consciousness: awake and alert  Post-procedure vital signs: reviewed and stable  Pain management: adequate  Airway patency: patent    PONV status at discharge: No PONV  Anesthetic complications: no      Cardiovascular status: blood pressure returned to baseline  Respiratory status: spontaneous ventilation  Hydration status: euvolemic  Follow-up not needed.          Vitals Value Taken Time   /69 10/31/22 1553   Temp 36.1 °C (97 °F) 10/31/22 1535   Pulse 90 10/31/22 1605   Resp 16 10/31/22 1546   SpO2 94 % 10/31/22 1605   Vitals shown include unvalidated device data.      No case tracking events are documented in the log.      Pain/Joaquim Score: Pain Rating Prior to Med Admin: 7 (10/31/2022  3:46 PM)

## 2022-10-31 NOTE — PLAN OF CARE
Shirley Wakefield has met all discharge criteria from Phase II. Vital Signs are stable, ambulating  without difficulty. Discharge instructions given, patient verbalized understanding. Discharged from facility via wheelchair in stable condition.

## 2022-10-31 NOTE — DISCHARGE INSTRUCTIONS
"  Caring for a Closed Suction Drainage Tube  A drainage tube removes fluid from around an incision. This helps prevent infection and promotes healing. The collection bulb at the end of the tube is squeezed and plugged to create suction. The bulb should be emptied and reset when half full to maintain adequate suction. You need to empty the bulb and clean the skin around the drain as often as your healthcare provider tells you to. Follow the steps below.     What youll need  Have the following items ready:  Disposable gloves  Measuring cup  Record sheet  Gauze or paper towel  Sterile cotton swabs or 4" x 4" gauze pads  Sterile saline or soap and water       Step 1. Empty the bulb  Wash your hands and put on a new pair of disposable gloves.  Point the top of the bulb away from you and remove the stopper.  Turn the bulb upside down over a measuring cup. Squeeze the fluid into the cup. Make sure the bulb is totally empty.  Put the cup to one side. You can record the volume of liquid in the cup after you clean and reconnect the bulb in step 2.    Step 2. Clean and reconnect the bulb  Clean the top of the bulb with clean gauze or a paper towel, if needed.  Squeeze the bulb tight, and put the stopper back on the top.  Record the amount of fluid in the cup. Then, empty the cup as directed.       When to call your healthcare provider  Call your healthcare provider if you notice any of these changes:  The amount of fluid increases or decreases suddenly  Large amount of blood or a clot in drainage  Color, odor, or thickness of the fluid changes  Tube falls out or the incision opens  Skin around the drain is red, swollen, painful, or seeping pus  You have a fever of 100.4°F (38°C) or higher, or as directed by your healthcare provider     If the tube isn't draining  Here are tips to drain the tube:  Uncurl any kinks in the tube.  With one hand, firmly hold the base of the tube between your thumb and index finger. Do not touch " "the incision.  Put the thumb and index finger of your other hand on the tube, next to the first hand. Pinch your fingers together. Then pull them along the tube toward the bag. This will help push any clogged fluid through the tube. This is called "stripping the tube." You may find it helpful to hold an alcohol swab between your fingers and the tube to lubricate the tubing.  If the tube still does not drain, call your healthcare provider.   Date Last Reviewed: 12/1/2016  © 8765-6253 [employer]. [address] [place] [address]. All rights reserved. This information is not intended as a substitute for professional medical care. Always follow your healthcare professional's instructions.   "

## 2022-10-31 NOTE — OP NOTE
[facility] ([place])  Operative Note      Date of Procedure: 10/31/2022     Procedure: Procedure(s) (LRB):  PLATYSMAPLASTY /  full necklift with skin only (N/A)  RHYTIDECTOMY, FACE / face lift and fat transfer (N/A)  LIPOSUCTION, WITH FAT TRANSFER (N/A)     Surgeon(s) and Role:     * Mickey Sanchez MD - Primary    Assisting Surgeon: None    Pre-Operative Diagnosis: Excess skin [L98.7]    Post-Operative Diagnosis: Post-Op Diagnosis Codes:     * Excess skin [L98.7]    Anesthesia: General    Operative Findings (including complications, if any): Facelift and neck lift with fat transfer-14cc.  SMAS and plastysma plication and platsymaplasty    Description of Technical Procedures:   Informed consent, patient was marked in the preoperative area for a face and neck lift.  As well as liposuction to the abdomen.  She was taken to the operating room placed on operative table in supine position.  She was administered general anesthesia and was intubated.  A Swanson catheter was placed.  The patient's face and abdomen was prepped in the usual sterile fashion.  Local anesthetic was injected into preoperative markings.  Tumescent was injected into the abdomen as well as the neck and face in a subcutaneous plane.  Liposuction was performed in the abdomen with a 4 mm cannula and harvested for fat grafting later.  This cannula incision was closed with a fast gut.  Liposuction was performed on the neck with a 3 mm cannula.  A 3 mm cannula was used to then dissect in the subcutaneous plane on the face without suctioned.  The neck incision was made under the submental crease.  Dissection was taken down to the level of the cricoid cartilage.  A strip of midline platysmal fat was removed.  Undermining of each platysma muscle was performed for 1-2 cm.  Distally the platysma was dissected and divided near the cricoid cartilage.  A platysma plasty was then performed by imbricating the muscle in the midline using 3-0 Vicryl  interrupted, and a running subcuticular 4-0 PDS.  The incisions were made underneath the sideburn in front of the tragus tragus and postauricular into the hairline, dissection was performed with 15 blade and face-lift scissors.  Dissection of the neck was performed 1st in the supra platysmal plane.  Dissection over the SCM was performed carefully not injuring underlying nerves. Hemostasis was obtained with electrocautery.  Face was then dissected in the supra SMAS plane.  Dissection did not go medial to the lateral canthus.  Once dissection was completed hemostasis was obtained with electrocautery.  This mass plication was performed in the plane of the nasolabial folds, using 3-0 PDS in interrupted fashion.  This was performed bilaterally.  Platysmal plication occurred in the same fashion so the platysmal muscle to the sternocleidomastoid fascia, using 3-0 PDS in an interrupted fashion.  The skin was then draped over the ears and excess skin was excised with a 15 blade and scissors.  The skin was then anchored down to 3 blocks with 3-0 PDS superiorly to the anterior fascia inferior ear and posteriorly to the mastoid fascia.  Posterior skin was stapled together anterior skin was sutured with 6 0 nylon and interrupted and running fashion.  Postauricular skin was closed with 5 0 fast gut.  Prior to closure a 15 drain was placed and secured with a nylon suture.  Prior to closure Cellerate was placed into the wound to help with hemostasis as well as postoperative healing.  The chin incision was closed with 3-0 Vicryl and 6 0 nylon.  Fat was then injected into the nasolabial folds as well as the cheeks and into the perioral area using an 18 cannula through multiple small poke incisions and while pulling back on the syringe injection was performed.  Total of 14 cc was used with symmetry bilaterally.  The patient was then wrapped in a compression wrap and awoken from anesthesia and taken recovery.    Significant Surgical  Tasks Conducted by the Assistant(s), if Applicable: Assist    Estimated Blood Loss (EBL): * No values recorded between 10/31/2022 11:13 AM and 10/31/2022  3:11 PM *           Implants: * No implants in log *    Specimens:   Specimen (24h ago, onward)      None                    Condition: Good    Disposition: PACU - hemodynamically stable.    Attestation: I was present and scrubbed for the entire procedure.    Discharge Note    OUTCOME: Patient tolerated treatment/procedure well without complication and is now ready for discharge.    DISPOSITION: Home or Self Care    FINAL DIAGNOSIS:  Aging face    FOLLOWUP: In clinic    DISCHARGE INSTRUCTIONS:  No discharge procedures on file.

## 2022-10-31 NOTE — INTERVAL H&P NOTE
The patient has been examined and the H&P has been reviewed:    I concur with the findings and no changes have occurred since H&P was written.    Surgery risks, benefits and alternative options discussed and understood by patient/family.    The patient incurs about doing eyelid surgery today, I discussed the surgery being booked for a certain time period, and if time permits, perhaps I will try, but most likely not.       There are no hospital problems to display for this patient.

## 2022-11-01 VITALS
DIASTOLIC BLOOD PRESSURE: 87 MMHG | HEIGHT: 62 IN | WEIGHT: 138 LBS | BODY MASS INDEX: 25.4 KG/M2 | SYSTOLIC BLOOD PRESSURE: 141 MMHG | HEART RATE: 87 BPM | OXYGEN SATURATION: 95 % | TEMPERATURE: 98 F | RESPIRATION RATE: 16 BRPM

## 2023-07-15 ENCOUNTER — HOSPITAL ENCOUNTER (EMERGENCY)
Facility: HOSPITAL | Age: 53
Discharge: HOME | End: 2023-07-15
Attending: EMERGENCY MEDICINE
Payer: MEDICAID

## 2023-07-15 VITALS
DIASTOLIC BLOOD PRESSURE: 68 MMHG | RESPIRATION RATE: 18 BRPM | OXYGEN SATURATION: 99 % | HEIGHT: 63 IN | SYSTOLIC BLOOD PRESSURE: 112 MMHG | HEART RATE: 68 BPM | WEIGHT: 144 LBS | TEMPERATURE: 98 F | BODY MASS INDEX: 25.52 KG/M2

## 2023-07-15 DIAGNOSIS — Z18.9 RETAINED FOREIGN BODY: Primary | ICD-10-CM

## 2023-07-15 DIAGNOSIS — T14.8XXA PUNCTURE WOUND: ICD-10-CM

## 2023-07-15 PROCEDURE — 99283 EMERGENCY DEPT VISIT LOW MDM: CPT | Mod: ER

## 2023-07-15 NOTE — ED PROVIDER NOTES
"Source of History:  Patient, chart    Chief complaint:  foreign body in foot      HPI:  Shirley Wakefield is a 53 y.o. female presenting with left heel pain.  Pt states she stepped on a nail "a few weeks ago" and is now having pain in her heel.  Pt states she was seen at that time and placed on antibiotics.  Pt states she feels like something is under her skin.      This is the extent to the patients complaints today here in the emergency department.    ROS: As per HPI and below:  Constitutional: No fever.  No chills.  Eyes: No visual changes.   ENT: No sore throat. No ear pain.  Urinary: No abnormal urination.  MSK: No back pain. No joint pain.   Integument: No rashes or lesions.    Review of patient's allergies indicates:   Allergen Reactions    Sulfa (sulfonamide antibiotics) Hives    Sulfamethoxazole-trimethoprim Hives     Other reaction(s): Not Indicated       PMH:  As per HPI and below:  Past Medical History:   Diagnosis Date    Hypothyroid      Past Surgical History:   Procedure Laterality Date    BREAST SURGERY      CARPAL TUNNEL RELEASE      FACELIFT N/A 10/31/2022    Procedure: RHYTIDECTOMY, FACE;  Surgeon: Mickey Sanchez MD;  Location: [facility];  Service: Plastics;  Laterality: N/A;   face lift and fat transfer    HYSTERECTOMY      LIPOSUCTION      LIPOSUCTION W/ FAT INJECTION N/A 10/31/2022    Procedure: LIPOSUCTION, WITH FAT TRANSFER;  Surgeon: Mickey Sanchez MD;  Location: [facility];  Service: Plastics;  Laterality: N/A;    PLATYSMAPLASTY N/A 10/31/2022    Procedure: PLATYSMAPLASTY;  Surgeon: Mickey Sanchez MD;  Location: [facility];  Service: Plastics;  Laterality: N/A;  3 hours  /  full necklift with skin only    TONSILLECTOMY         Social History     Tobacco Use    Smoking status: Former     Packs/day: 1.00     Types: Cigarettes     Quit date: 10/4/2022     Years since quittin.7    Smokeless tobacco: Never   Substance Use Topics    Alcohol use: Yes     Comment: occasional    Drug use: No " "      Physical Exam:    /68   Pulse 68   Temp 98.2 °F (36.8 °C) (Oral)   Resp 18   Ht 5' 3" (1.6 m)   Wt 65.3 kg (144 lb)   LMP  (LMP Unknown)   SpO2 99%   BMI 25.51 kg/m²   Nursing note and vital signs reviewed.  Constitutional: No acute distress.  Nontoxic  Eyes: No conjunctival injection. Extraocular muscles intact.  ENT: Normal phonation.  Musculoskeletal: Good range of motion all joints.  No deformities.  Neck supple.  No meningismus. Neurovascularly intact.  Skin: Callus noted to left heel.  Mild TTP noted.  No redness, erythema or drainage noted.  No rashes seen.  Good turgor.  No abrasions.  No ecchymoses.  Psych:  Alert and oriented x 3.  Appropriate, conversant.    [facility]    Emergent evaluation of a 52 yo female presenting for left heel pain.  Pt states that she stepped on a nail a few weeks ago and is having pain at this time.  Patient states pain is worse with walking.  Patient states she does walk barefoot typically.  On exam pt is A&Ox3. VSS. Nonfebrile and nontoxic appearing.  Mucous membranes pink and moist.  Callus noted to left heel.  Mild TTP noted.  No redness, erythema or drainage noted.  Steady gait appreciated. Cap refill < 3 seconds.      History Acquisition   Additional history was acquired from other historians.  Chart    [facility] visit on 5/17 for puncture wound.      The patient's list of active medical problems, social history, medications, and allergies as documented per RN staff has been reviewed.     Differential Diagnoses   Based on available information and the initial assessment, the working differential diagnoses considered during this evaluation include but are not limited to retained foreign body, abscess, neuropathy, callus, others.     I will get imaging and reassess.        LABS   Labs Reviewed - No data to display      Imaging     Imaging Results              X-Ray Foot Complete Left (Final result)  Result time 07/15/23 14:03:19      Final result by Segundo Thompson MD " (07/15/23 14:03:19)                   Impression:      Suspected small soft tissue foreign body at the left heel, as above.    No acute displaced fracture-dislocation identified.      Electronically signed by: Segundo Thompson MD  Date:    07/15/2023  Time:    14:03               Narrative:    EXAMINATION:  XR FOOT COMPLETE 3 VIEW LEFT    CLINICAL HISTORY:  .  Other injury of unspecified body region, initial encounter    TECHNIQUE:  AP, lateral and oblique views of the left foot were performed.    COMPARISON:  None    FINDINGS:  Bones are well mineralized. Overall alignment is within normal limits. No displaced fracture, dislocation or destructive osseous process.  Baseline minimal DJD.    Within the plantar soft tissues at the heel there is a 4-5 mm thin curvilinear radiodensity consistent with foreign body.  The foreign body is approximately 7-9 mm deep to the skin surface near the calcaneal tuberosity.  No subcutaneous emphysema or additional foreign body.                                      A radiology report was available for my review at the time of the encounter.    EKG        Additional Consideration   All available testing was considered during the course of this workup.  Comorbidities taken into consideration during the patient's evaluation and treatment include weight, age.    Social determinants of health were taken into consideration during development of our treatment plan.    Medications - No data to display   ED Course as of 07/15/23 1430   Sat Jul 15, 2023   1405 X-ray shows a4-5 mm thin curvilinear radiodensity approximately 7-9 mm deep to the skin surface near the calcaneal tuberosity. [RZ]   1412 Patient updated it on x-ray.  Advised we have placed a referral for Podiatry follow-up.  Advised to wear soft soled shoes until follow-up.  Patient given phone number to call.  Patient given strict return to ED precautions.  Patient verbalized understanding of this plan of care.  All questions and concerns  addressed. [RZ]   1413 Patient is hemodynamically stable, vital signs are normal. Discharge instructions given. Return to ED precautions discussed. Follow up as directed. Pt verbalized understanding of this plan.  Pt is stable for discharge. [RZ]      ED Course User Index  [RZ] Jaja Lord NP             CLINICAL IMPRESSION  1. Retained foreign body    2. Puncture wound         ED Disposition Condition    Discharge Stable            Instruction:  I see no indication of an emergent process beyond that addressed during our encounter but have duly counseled the patient/family regarding the need for prompt follow-up as well as the indications that should prompt immediate return to the emergency room should new or worrisome developments occur.  The patient/family has been provided with verbal and printed direction regarding our final diagnosis(es) as well as instructions regarding use of OTC and/or Rx medications intended to manage the patient's aforementioned conditions including:  ED Prescriptions    None       Patient has been advised of following recommended follow-up instructions:  Follow-up Information       Follow up With Specialties Details Why Contact Info    Idalmis Baumann MD Family Medicine Schedule an appointment as soon as possible for a visit  As needed [address]  [address]  [facility] [address]  [phone]      Stephanie Cardoza DPM Podiatry Schedule an appointment as soon as possible for a visit  As needed [address] [facility] [address] [phone]      [facility] - [facility] Podiatry Schedule an appointment as soon as possible for a visit   [address] [facility] [address] [phone]          The patient/family communicates understanding of all this information and all remaining questions and concerns were addressed at this time.      The patient's condition did not warrant review of the  and prescription of controlled substances.      This note was created  using dictation software.  This program may occasionally mistype words and phrases.       Jaja Lord NP  07/15/23 [id number]

## 2023-11-01 NOTE — DISCHARGE INSTRUCTIONS
You were seen and evaluated in the ER today.  It does appear that you have a retained foreign body in your heel.  Due to this being an old injury we have placed a referral for Podiatry follow-up.  Please call and schedule an appointment next week.  Please follow-up with your PCP as needed.     Our goal in the emergency department is to always give you outstanding care and exceptional service. You may receive a survey by mail or e-mail in the next week regarding your experience in our ED. We would greatly appreciate your completing and returning the survey. Your feedback provides us with a way to recognize our staff who give very good care and it helps us learn how to improve when your experience was below our aspiration of excellence.   
English

## 2023-12-02 ENCOUNTER — HOSPITAL ENCOUNTER (EMERGENCY)
Facility: HOSPITAL | Age: 53
Discharge: HOME | End: 2023-12-02
Attending: EMERGENCY MEDICINE
Payer: MEDICAID

## 2023-12-02 VITALS
OXYGEN SATURATION: 99 % | HEIGHT: 63 IN | SYSTOLIC BLOOD PRESSURE: 120 MMHG | TEMPERATURE: 98 F | DIASTOLIC BLOOD PRESSURE: 60 MMHG | HEART RATE: 68 BPM | RESPIRATION RATE: 17 BRPM | BODY MASS INDEX: 22.15 KG/M2 | WEIGHT: 125 LBS

## 2023-12-02 DIAGNOSIS — J40 BRONCHITIS: ICD-10-CM

## 2023-12-02 DIAGNOSIS — R07.9 CHEST PAIN: ICD-10-CM

## 2023-12-02 DIAGNOSIS — R06.02 SOB (SHORTNESS OF BREATH): Primary | ICD-10-CM

## 2023-12-02 DIAGNOSIS — Z71.6 TOBACCO ABUSE COUNSELING: ICD-10-CM

## 2023-12-02 DIAGNOSIS — R91.8 MULTIPLE PULMONARY NODULES: ICD-10-CM

## 2023-12-02 DIAGNOSIS — Z72.0 TOBACCO ABUSE: ICD-10-CM

## 2023-12-02 LAB
ALBUMIN SERPL-MCNC: 3.6 G/DL (ref 3.3–5.5)
ALP SERPL-CCNC: 68 U/L (ref 42–141)
BILIRUB SERPL-MCNC: 0.4 MG/DL (ref 0.2–1.6)
BUN SERPL-MCNC: 16 MG/DL (ref 7–22)
CALCIUM SERPL-MCNC: 9.1 MG/DL (ref 8–10.3)
CHLORIDE SERPL-SCNC: 108 MMOL/L (ref 98–108)
CREAT SERPL-MCNC: 0.6 MG/DL (ref 0.6–1.2)
GLUCOSE SERPL-MCNC: 97 MG/DL (ref 73–118)
HCT, POC: NORMAL
HGB, POC: NORMAL (ref 14–18)
MCH, POC: NORMAL
MCHC, POC: NORMAL
MCV, POC: NORMAL
MPV, POC: NORMAL
POC ALT (SGPT): 15 U/L (ref 10–47)
POC AST (SGOT): 16 U/L (ref 11–38)
POC B-TYPE NATRIURETIC PEPTIDE: 41.2 PG/ML (ref 0–100)
POC CARDIAC TROPONIN I: 0 NG/ML (ref 0–0.08)
POC PLATELET COUNT: NORMAL
POC PTINR: 1 (ref 0.9–1.2)
POC PTWBT: 11.9 SEC (ref 9.7–14.3)
POC TCO2: 30 MMOL/L (ref 18–33)
POTASSIUM BLD-SCNC: 4 MMOL/L (ref 3.6–5.1)
PROTEIN, POC: 6.8 G/DL (ref 6.4–8.1)
RBC, POC: NORMAL
RDW, POC: NORMAL
SAMPLE: NORMAL
SAMPLE: NORMAL
SODIUM BLD-SCNC: 142 MMOL/L (ref 128–145)
WBC, POC: NORMAL

## 2023-12-02 PROCEDURE — 93010 ELECTROCARDIOGRAM REPORT: CPT | Mod: ,,, | Performed by: INTERNAL MEDICINE

## 2023-12-02 PROCEDURE — 99285 EMERGENCY DEPT VISIT HI MDM: CPT | Mod: 25,ER

## 2023-12-02 PROCEDURE — 93010 EKG 12-LEAD: ICD-10-PCS | Mod: ,,, | Performed by: INTERNAL MEDICINE

## 2023-12-02 PROCEDURE — 84484 ASSAY OF TROPONIN QUANT: CPT | Mod: ER

## 2023-12-02 PROCEDURE — 25500020 PHARM REV CODE 255: Mod: ER | Performed by: EMERGENCY MEDICINE

## 2023-12-02 PROCEDURE — 83880 ASSAY OF NATRIURETIC PEPTIDE: CPT | Mod: ER

## 2023-12-02 PROCEDURE — 80053 COMPREHEN METABOLIC PANEL: CPT | Mod: ER

## 2023-12-02 PROCEDURE — 93005 ELECTROCARDIOGRAM TRACING: CPT | Mod: ER

## 2023-12-02 RX ORDER — ALBUTEROL SULFATE 90 UG/1
2 AEROSOL, METERED RESPIRATORY (INHALATION) EVERY 4 HOURS PRN
Qty: 18 G | Refills: 0 | Status: SHIPPED | OUTPATIENT
Start: 2023-12-02 | End: 2024-12-01

## 2023-12-02 RX ORDER — DOXYCYCLINE 100 MG/1
100 CAPSULE ORAL 2 TIMES DAILY
Qty: 20 CAPSULE | Refills: 0 | Status: SHIPPED | OUTPATIENT
Start: 2023-12-02 | End: 2023-12-12

## 2023-12-02 RX ADMIN — IOHEXOL 75 ML: 350 INJECTION, SOLUTION INTRAVENOUS at 11:12

## 2023-12-02 NOTE — Clinical Note
"Shirley"CHOLO Wakefield was seen and treated in our emergency department on 12/2/2023.  She may return to work on 12/03/2023.       If you have any questions or concerns, please don't hesitate to call.      Jocelyn Ceja MD"

## 2023-12-02 NOTE — ED PROVIDER NOTES
Encounter Date: 12/2/2023    SCRIBE #1 NOTE: I, Erik Tariq, am scribing for, and in the presence of,  Jocelyn Ceja MD.       History     Chief Complaint   Patient presents with    Shortness of Breath     Patient present w/ a c/o of SOB since Wednesday. States that she had a COVID, flu, and RSV swab yesterday at an Urgent care. Negative for flu, however no results received for flu, and RSV. Albuterol inhaler w/o any relief. GCS 15. VSS.     Shirley Wakefield is a 53 y.o. female with a PMHx of hypothyroid, who presents to the ED for evaluation of dyspnea beginning 4 days ago. Patient reports she initially tested positive for Covid 2.5 weeks ago; her symptoms resolved this past weekend. She reports she began having dyspnea on Wednesday night. She was evaluated for her complaints on Thursday at an Urgent Care clinic, noting she received a steroid injection during her visit, and was prescribed an albuterol inhaler, Z-pack, and sinus medication that she states does not remember the name off. She notes her SOB has not subsided despite compliance with the prescribed medications. She also notes associated chest tightness, and a productive cough. She also notes intermittent abdominal pain for the past few days. Compliant with levothyroxine, gabapentin, and trazodone. No medications taken PTA. No alleviating or exacerbating factors noted. Denies leg swelling, fever, nausea, vomiting, CP, or other associated symptoms. Endorses tobacco usage, noting she smoked a cigarette this morning, but denies EtOH, or other illicit drug usage.     The history is provided by the patient. No  was used.     Review of patient's allergies indicates:   Allergen Reactions    Sulfa (sulfonamide antibiotics) Hives    Sulfamethoxazole-trimethoprim Hives     Other reaction(s): Not Indicated     Past Medical History:   Diagnosis Date    Hypothyroid      Past Surgical History:   Procedure Laterality Date    BREAST SURGERY      CARPAL  TUNNEL RELEASE      FACELIFT N/A 10/31/2022    Procedure: RHYTIDECTOMY, FACE;  Surgeon: Mickey Sanchez MD;  Location: [facility];  Service: Plastics;  Laterality: N/A;   face lift and fat transfer    HYSTERECTOMY      LIPOSUCTION      LIPOSUCTION W/ FAT INJECTION N/A 10/31/2022    Procedure: LIPOSUCTION, WITH FAT TRANSFER;  Surgeon: Mickey Sanchez MD;  Location: [facility] OR;  Service: Plastics;  Laterality: N/A;    PLATYSMAPLASTY N/A 10/31/2022    Procedure: PLATYSMAPLASTY;  Surgeon: Mickey Sanchez MD;  Location: [facility] OR;  Service: Plastics;  Laterality: N/A;  3 hours  /  full necklift with skin only    TONSILLECTOMY       No family history on file.  Social History     Tobacco Use    Smoking status: Former     Current packs/day: 0.00     Types: Cigarettes     Quit date: 10/4/2022     Years since quittin.1    Smokeless tobacco: Never   Substance Use Topics    Alcohol use: Yes     Comment: occasional    Drug use: No     Review of Systems   Constitutional:  Negative for activity change, appetite change, chills and fever.   HENT:  Negative for congestion, rhinorrhea, sneezing and sore throat.    Respiratory:  Positive for cough, chest tightness and shortness of breath. Negative for choking and wheezing.    Cardiovascular:  Negative for chest pain, palpitations and leg swelling.   Gastrointestinal:  Negative for abdominal pain, diarrhea, nausea and vomiting.   Neurological:  Negative for dizziness, syncope, light-headedness and headaches.   All other systems reviewed and are negative.      Physical Exam     Initial Vitals [23 1010]   BP Pulse Resp Temp SpO2   120/60 80 (!) 22 98.3 °F (36.8 °C) 99 %      MAP       --         Physical Exam    Nursing note and vitals reviewed.  Constitutional: She appears well-developed and well-nourished. No distress.   HENT:   Head: Normocephalic and atraumatic.   Eyes: Conjunctivae are normal.   Neck:   Normal range of motion.  Cardiovascular:  Normal rate, regular rhythm and  normal heart sounds.           No murmur heard.  Pulmonary/Chest: Breath sounds normal. No respiratory distress. She has no wheezes.   Abdominal: Abdomen is soft. Bowel sounds are normal. She exhibits no distension. There is no abdominal tenderness.   Musculoskeletal:         General: No tenderness or edema. Normal range of motion.      Cervical back: Normal range of motion.     Neurological: She is alert and oriented to person, place, and time. GCS score is 15. GCS eye subscore is 4. GCS verbal subscore is 5. GCS motor subscore is 6.   Skin: Skin is warm and dry.   Psychiatric: She has a normal mood and affect. Her behavior is normal.         ED Course   Procedures  Labs Reviewed   TROPONIN ISTAT   POCT CBC   POCT CMP   POCT PROTIME-INR   POCT TROPONIN   POCT B-TYPE NATRIURETIC PEPTIDE (BNP)   ISTAT PROCEDURE   POCT CMP   POCT B-TYPE NATRIURETIC PEPTIDE (BNP)     EKG Readings: (Independently Interpreted)   Initial Reading: No STEMI. Rhythm: Normal Sinus Rhythm. Heart Rate: 71. Ectopy: No Ectopy. Conduction: Normal. ST Segments: Normal ST Segments. T Waves: Normal. Clinical Impression: Normal Sinus Rhythm Other Impression: independently interpreted by me       Imaging Results              CTA Chest Non-Coronary (PE Studies) (Final result)  Result time 12/02/23 12:10:12      Final result by Carol Allison MD (12/02/23 12:10:12)                   Impression:      No evidence of pulmonary embolus.    Few scattered less than 5 mm pulmonary nodules bilaterally, nonspecific.  For multiple solid nodules all <6 mm, Fleischner Society 2017 guidelines recommend no routine follow up for a low risk patient, or follow up with non-contrast chest CT at 12 months after discovery in a high risk patient.    Subsegmental area of opacity suggestive of subsegmental atelectasis or scarring  medial segment right middle lobe, 1.8 cm.  Consider 3 months follow-up to monitor the finding and ensure stability or  resolution.      Electronically signed by: Carol Allison MD  Date:    12/02/2023  Time:    12:10               Narrative:    EXAMINATION:  CTA CHEST NON CORONARY (PE STUDIES)    CLINICAL HISTORY:  SOB, recent COVID;    TECHNIQUE:  Low dose axial images, sagittal and coronal reformations were obtained from the thoracic inlet to the lung bases following the IV administration of 75 mL of Omnipaque 350.  Contrast timing was optimized to evaluate the pulmonary arteries.  MIP images were performed.    COMPARISON:  None    FINDINGS:  Vasculature: Good opacification of the pulmonary arterial system.  No acute pulmonary embolus identified. Thoracic aorta normal caliber. No evidence of dissection.    Lungs: The tracheobronchial tree is clear. Subsegmental atelectasis or scarring medial segment of the right middle lobe.  Few very small (5 mm) pulmonary nodules noted bilaterally.  No emphysematous lung changes.No pleural effusion.    Mediastinum: No lymphadenopathy.  The cardiac silhouette is normal in size.No significant coronary artery calcification.  No pericardial effusion.The esophagus course normally.    Upper abdomen (visualized portion):No abnormality seen    Bones/chest wall: No marrow replacement process.                                       X-Ray Chest AP Portable (Final result)  Result time 12/02/23 10:40:46   Procedure changed from X-Ray Chest PA And Lateral     Final result by Ita Devlin MD (12/02/23 10:40:46)                   Impression:      Clear lungs.      Electronically signed by: Ita Devlin MD  Date:    12/02/2023  Time:    10:40               Narrative:    EXAMINATION:  XR CHEST AP PORTABLE    CLINICAL HISTORY:  Chest Pain; Chest pain, unspecified    TECHNIQUE:  Single frontal view of the chest was performed.    COMPARISON:  Prior dated 09/23/2022    FINDINGS:  Mediastinal structures are midline.  The cardiac silhouette is not enlarged.  The lungs appear grossly clear.                                        Medications   iohexoL (OMNIPAQUE 350) injection 100 mL (75 mLs Intravenous Given 12/2/23 1118)     Medical Decision Making  53 y.o. female with hypothyroidism, presents with dyspnea, chest tightness, and a productive cough beginning 4 days ago.  On exam, breath sounds are clear bilaterally, rate is normal and rhythm is regular, and abdomen is soft, non-tender, and non-distended. In shared decision making with the patient I will order labs, imaging, and EKG. EKG with no arrhythmia. Labs with no anemia or dehydration. CXR with no infiltrate, edema, mass or pneumothorax. CTA with no signs of PE. She does have several small pulmonary nodules and is a smoker, so will have her follow up with PCP in 1 year for repeat CT. I feel this is bronchitis in this smoker. Will treat with doxycycline, continue albuterol use. Recommend smoking cessation.     Amount and/or Complexity of Data Reviewed  Labs: ordered.  Radiology: ordered.  ECG/medicine tests: ordered and independent interpretation performed. Decision-making details documented in ED Course.    Risk  Prescription drug management.            Scribe Attestation:   Scribe #1: I performed the above scribed service and the documentation accurately describes the services I performed. I attest to the accuracy of the note.                           I, Dr. Jocelyn Ceja, personally performed the services described in this documentation.   All medical record entries made by the scribe were at my direction and in my presence.   I have reviewed the chart and agree that the record is accurate and complete.   Jocelyn Ceja MD.  10:47 AM 12/02/2023      Clinical Impression:  Final diagnoses:  [R07.9] Chest pain  [R06.02] SOB (shortness of breath) (Primary)  [J40] Bronchitis  [Z72.0] Tobacco abuse  [Z71.6] Tobacco abuse counseling  [R91.8] Multiple pulmonary nodules          ED Disposition Condition    Discharge Stable          ED Prescriptions       Medication Sig  Dispense Start Date End Date Auth. Provider    doxycycline (VIBRAMYCIN) 100 MG Cap Take 1 capsule (100 mg total) by mouth 2 (two) times daily. for 10 days 20 capsule 12/2/2023 12/12/2023 Jocelyn Ceja MD    albuterol (PROAIR HFA) 90 mcg/actuation inhaler Inhale 2 puffs into the lungs every 4 (four) hours as needed for Shortness of Breath or Wheezing. Rescue 18 g 12/2/2023 12/1/2024 Jocelyn Ceja MD          Follow-up Information       Follow up With Specialties Details Why Contact Info Additional Information    [facility] Family [facility] Schedule an appointment as soon as possible for a visit   [address] [facility] [address] [phone] 2nd Floor             Jocelyn Ceja MD  12/02/23 [id number]

## 2023-12-02 NOTE — DISCHARGE INSTRUCTIONS
Based on your symptoms, you most likely have bronchitis. Take all of the prescribed antibiotics. Use the inhaler as needed. You have multiple pulmonary nodules - this is likely form smoking. You need to monitor these. Talk to your doctor about this finding and they will order repeat CTs in the future. Talk to your doctor about medications to help you stop smoking.

## (undated) DEVICE — COTTON BALLS MEDIUM  N/S

## (undated) DEVICE — Device

## (undated) DEVICE — SPONGE LAP 18X18 PREWASHED

## (undated) DEVICE — DRAPE STERI INSTRUMENT 1018

## (undated) DEVICE — SUT PDSII 4-0 PS-2 CLEAR MO

## (undated) DEVICE — SOL PVP-I SCRUB 7.5% 4OZ

## (undated) DEVICE — TRAY DRY SKIN SCRUB PREP

## (undated) DEVICE — DRESSING TELFA N ADH 3X8

## (undated) DEVICE — STOCKINETTE UNBLEACHED 4X25D

## (undated) DEVICE — RUBBERBAND STERILE 3X1/8IN

## (undated) DEVICE — TIP SUCTION YANKAUER

## (undated) DEVICE — DRAPE ORTH SPLIT 77X108IN

## (undated) DEVICE — STRIP STERI REIN CLSR 1/2X2IN

## (undated) DEVICE — DRAPE TOP 53X102IN

## (undated) DEVICE — POSITIONER HEAD DONUT 9IN FOAM

## (undated) DEVICE — BLADE SURG STAINLESS STEEL #15

## (undated) DEVICE — DRAPE HEAD/BAR 40 X 27 W/ADH

## (undated) DEVICE — SUT VICRYL 3-0 27 RB-1

## (undated) DEVICE — STAPLER SKIN PROXIMATE WIDE

## (undated) DEVICE — DRAIN BLAKE HUBLS 10F RD

## (undated) DEVICE — EVACUATOR WOUND BULB 100CC

## (undated) DEVICE — SYR 10CC LUER LOCK

## (undated) DEVICE — SYR ONLY LUER LOCK 20CC